# Patient Record
Sex: FEMALE | Race: BLACK OR AFRICAN AMERICAN | Employment: PART TIME | URBAN - METROPOLITAN AREA
[De-identification: names, ages, dates, MRNs, and addresses within clinical notes are randomized per-mention and may not be internally consistent; named-entity substitution may affect disease eponyms.]

---

## 2018-03-16 ENCOUNTER — OFFICE VISIT (OUTPATIENT)
Dept: ORTHOPEDIC SURGERY | Age: 18
End: 2018-03-16

## 2018-03-16 VITALS
BODY MASS INDEX: 23.25 KG/M2 | SYSTOLIC BLOOD PRESSURE: 116 MMHG | HEIGHT: 69 IN | WEIGHT: 157 LBS | HEART RATE: 72 BPM | DIASTOLIC BLOOD PRESSURE: 70 MMHG

## 2018-03-16 DIAGNOSIS — M25.561 ACUTE PAIN OF RIGHT KNEE: Primary | ICD-10-CM

## 2018-03-16 DIAGNOSIS — S83.91XA SPRAIN OF RIGHT KNEE, UNSPECIFIED LIGAMENT, INITIAL ENCOUNTER: ICD-10-CM

## 2018-03-16 PROCEDURE — 99243 OFF/OP CNSLTJ NEW/EST LOW 30: CPT | Performed by: ORTHOPAEDIC SURGERY

## 2018-03-19 ENCOUNTER — TELEPHONE (OUTPATIENT)
Dept: ORTHOPEDIC SURGERY | Age: 18
End: 2018-03-19

## 2018-03-29 ENCOUNTER — OFFICE VISIT (OUTPATIENT)
Dept: ORTHOPEDIC SURGERY | Age: 18
End: 2018-03-29

## 2018-03-29 VITALS
HEIGHT: 69 IN | BODY MASS INDEX: 22.66 KG/M2 | DIASTOLIC BLOOD PRESSURE: 68 MMHG | SYSTOLIC BLOOD PRESSURE: 102 MMHG | WEIGHT: 153 LBS | HEART RATE: 83 BPM

## 2018-03-29 DIAGNOSIS — M22.2X1 PATELLOFEMORAL PAIN SYNDROME OF RIGHT KNEE: ICD-10-CM

## 2018-03-29 DIAGNOSIS — S83.91XA SPRAIN OF RIGHT KNEE, UNSPECIFIED LIGAMENT, INITIAL ENCOUNTER: Primary | ICD-10-CM

## 2018-03-29 PROCEDURE — L1812 KO ELASTIC W/JOINTS PRE OTS: HCPCS | Performed by: ORTHOPAEDIC SURGERY

## 2018-03-29 PROCEDURE — 99214 OFFICE O/P EST MOD 30 MIN: CPT | Performed by: ORTHOPAEDIC SURGERY

## 2018-03-29 NOTE — PROGRESS NOTES
reveals pedal pulses intact and brisk cap refill. Patient is able to dorsiflex and wiggle all toes. Deep tendon reflexes of the lower extremities are normal and symmetric. Imaging: MRI images of the right knee obtained at [facility] on 3/22/18 reviewed in the office today with the patient. ACL and PCL are intact. There is no evidence of meniscal tearing or degeneration. There is no effusion appreciated. There is inflammation within the lateral femoral condyle consistent with patellofemoral syndrome. There is mild lateral tilting of the patella. Impression: #1 right knee sprain #2 right knee patellofemoral syndrome    Plan: At this time, the patient will be sent to a course of formal physical therapy. She was fitted for a patellar J brace in the office today. The patient will continue with her improvement as needed. We will keep her out of PT activities for the next month after which she may return to this. She will follow-up with us in 6 weeks if she has continued issues. Procedures    Breg Hinged Lateral Stabilizer Knee Brace     Patient was prescribed a Breg Hinged Lateral Stabilizer. The right knee will require stabilization / immobilization from this semi-rigid / rigid orthosis to improve their function. The orthosis will assist in protecting the affected area, provide functional support and facilitate healing. The patient was educated and fit by a healthcare professional with expert knowledge and specialization in brace application while under the direct supervision of the physician. Verbal and written instructions for the use of and application of this item were provided. They were instructed to contact the office immediately should the brace result in increased pain, decreased sensation, increased swelling or worsening of the condition.

## 2018-04-06 ENCOUNTER — HOSPITAL ENCOUNTER (OUTPATIENT)
Dept: OTHER | Age: 18
Discharge: HOME | End: 2018-04-24
Attending: ORTHOPAEDIC SURGERY | Admitting: ORTHOPAEDIC SURGERY

## 2018-04-09 ENCOUNTER — HOSPITAL ENCOUNTER (OUTPATIENT)
Dept: PHYSICAL THERAPY | Age: 18
Discharge: HOME | End: 2018-04-10
Admitting: ORTHOPAEDIC SURGERY

## 2018-04-09 NOTE — PROGRESS NOTES
Physical Therapy  Cancellation/No-show Note  Patient Name:  Kvng Archer  :  2000   Date:  2018  Cancelled visits to date: 1  No-shows to date: 0    For today's appointment patient:  [x]  Cancelled  []  Rescheduled appointment  []  No-show     Reason given by patient:  []  Patient ill  []  Conflicting appointment  []  No transportation    []  Conflict with work  [x]  No reason given  []  Other:     Comments:  Pt ended up showing up at 1, thinking her appt was at 1    Electronically signed by:  Loreta Goldberg, PT

## 2018-04-11 ENCOUNTER — HOSPITAL ENCOUNTER (OUTPATIENT)
Dept: PHYSICAL THERAPY | Age: 18
Discharge: HOME | End: 2018-04-12
Admitting: ORTHOPAEDIC SURGERY

## 2018-04-11 NOTE — FLOWSHEET NOTE
Physical Therapy Daily Treatment Note  Date:  2018    Patient Name:  Margarette Smith    :  2000  MRN: [id number]  Restrictions/Precautions:    Pertinent Medical History:  Medical/Treatment Diagnosis Information:  · Diagnosis: Right knee ligament sprain and patellofemoral syndrome  · Treatment Diagnosis: R knee weakness and pain affecting ambulation and high level ADLs  Insurance/Certification information:  PT Insurance Information: Apryl Sandoval preferred  Physician Information:  Referring Practitioner: Dr. Mateus Richards of care signed (Y/N):    Visit# / total visits:     Pain level: 5/10     G-Code (if applicable):      Date / Visit # G-Code Applied:  18  PT G-Codes  Functional Assessment Tool Used: LEFS  Score: 50  Functional Limitation: Mobility: Walking and moving around  Mobility: Walking and Moving Around Current Status (): At least 20 percent but less than 40 percent impaired, limited or restricted  Mobility: Walking and Moving Around Goal Status (): At least 1 percent but less than 20 percent impaired, limited or restricted    Progress Note: []  Yes  []  No  Next due by: Visit #10      History of Injury:  Pt states on 3/10/18 she was roller skating and fell on her R knee after her skate hit something. Pt states since then her knee has hurt but it has gotten a little better. Pain is on both sides and rated 4-7/10. Knee hurts to put weight through it and to bend it. Has been wearing a brace. States she had MRI and x ray that showed swelling in knee cap. Pt wants to get back to sports and skating    Subjective:     18: Pt states knee is doing okay, pain is not too bad.  Little better    Objective:  Observation:   Test measurements:      Exercises:  Exercise/Equipment Resistance/Repetitions Other comments   Nu Step X 5 min, level 4     Incline 3 x 30    Leg Press R 2 x 20 35#    LAQ R x 20  2#                        HEP     QS and SLR     HS and Gastroc stretch     Sidelying SLR

## 2018-10-13 ENCOUNTER — HOSPITAL ENCOUNTER (INPATIENT)
Age: 18
LOS: 1 days | Discharge: HOME | DRG: 832 | End: 2018-10-13
Attending: OBSTETRICS & GYNECOLOGY | Admitting: OBSTETRICS & GYNECOLOGY
Payer: COMMERCIAL

## 2018-10-13 VITALS
SYSTOLIC BLOOD PRESSURE: 115 MMHG | BODY MASS INDEX: 25.48 KG/M2 | RESPIRATION RATE: 18 BRPM | TEMPERATURE: 97.5 F | HEIGHT: 69 IN | WEIGHT: 172 LBS | DIASTOLIC BLOOD PRESSURE: 63 MMHG | HEART RATE: 87 BPM

## 2018-10-13 PROCEDURE — 99201 HC NEW PT, OUTPT VISIT LEVEL 1: CPT

## 2018-10-13 PROCEDURE — 1220000000 HC SEMI PRIVATE OB R&B

## 2018-10-13 NOTE — FLOWSHEET NOTE
Pt arrived to unit with c/o constant pain under her left breast at rib cage with sudden onset after a sneeze. Placed in Triage A, urine specimen obtained, changed into gown and placed on EFM.

## 2018-10-13 NOTE — FLOWSHEET NOTE
Called Dr. Que Yanes and advised of patient's arrival to triage. Updated on EFM and pt's complaints. She will be in to evaluate patient.

## 2019-12-29 ENCOUNTER — HOSPITAL ENCOUNTER (INPATIENT)
Age: 19
LOS: 2 days | Discharge: HOME | DRG: 463 | End: 2019-12-31
Attending: EMERGENCY MEDICINE | Admitting: SPECIALIST
Payer: MEDICARE

## 2019-12-29 ENCOUNTER — APPOINTMENT (OUTPATIENT)
Dept: CT IMAGING | Age: 19
DRG: 463 | End: 2019-12-29
Payer: MEDICARE

## 2019-12-29 PROBLEM — N12 PYELONEPHRITIS: Status: ACTIVE | Noted: 2019-12-29

## 2019-12-29 LAB
A/G RATIO: 0.9 (ref 1.1–2.2)
ALBUMIN SERPL-MCNC: 3.8 G/DL (ref 3.4–5)
ALP BLD-CCNC: 70 U/L (ref 40–129)
ALT SERPL-CCNC: 16 U/L (ref 10–40)
ANION GAP SERPL CALCULATED.3IONS-SCNC: 17 MMOL/L (ref 3–16)
AST SERPL-CCNC: 17 U/L (ref 15–37)
BASOPHILS ABSOLUTE: 0 K/UL (ref 0–0.2)
BASOPHILS RELATIVE PERCENT: 0 %
BILIRUB SERPL-MCNC: 0.4 MG/DL (ref 0–1)
BILIRUBIN URINE: NEGATIVE
BLOOD, URINE: ABNORMAL
BUN BLDV-MCNC: 8 MG/DL (ref 7–20)
CALCIUM SERPL-MCNC: 9 MG/DL (ref 8.3–10.6)
CHLORIDE BLD-SCNC: 93 MMOL/L (ref 99–110)
CLARITY: ABNORMAL
CO2: 20 MMOL/L (ref 21–32)
COLOR: YELLOW
CREAT SERPL-MCNC: 0.6 MG/DL (ref 0.6–1.1)
EOSINOPHILS ABSOLUTE: 0 K/UL (ref 0–0.6)
EOSINOPHILS RELATIVE PERCENT: 0 %
EPITHELIAL CELLS, UA: 4 /HPF (ref 0–5)
GFR AFRICAN AMERICAN: >60
GFR NON-AFRICAN AMERICAN: >60
GLOBULIN: 4.1 G/DL
GLUCOSE BLD-MCNC: 136 MG/DL (ref 70–99)
GLUCOSE URINE: NEGATIVE MG/DL
HCG(URINE) PREGNANCY TEST: NEGATIVE
HCT VFR BLD CALC: 38.7 % (ref 36–48)
HEMATOLOGY PATH CONSULT: YES
HEMOGLOBIN: 12.9 G/DL (ref 12–16)
HYALINE CASTS: 3 /LPF (ref 0–8)
KETONES, URINE: ABNORMAL MG/DL
LACTIC ACID: 0.7 MMOL/L (ref 0.4–2)
LACTIC ACID: 1.1 MMOL/L (ref 0.4–2)
LEUKOCYTE ESTERASE, URINE: ABNORMAL
LIPASE: 27 U/L (ref 13–60)
LYMPHOCYTES ABSOLUTE: 2.4 K/UL (ref 1–5.1)
LYMPHOCYTES RELATIVE PERCENT: 20 %
MAGNESIUM: 2 MG/DL (ref 1.8–2.4)
MCH RBC QN AUTO: 29.7 PG (ref 26–34)
MCHC RBC AUTO-ENTMCNC: 33.4 G/DL (ref 31–36)
MCV RBC AUTO: 88.8 FL (ref 80–100)
MICROSCOPIC EXAMINATION: YES
MONOCYTES ABSOLUTE: 1.6 K/UL (ref 0–1.3)
MONOCYTES RELATIVE PERCENT: 13 %
NEUTROPHILS ABSOLUTE: 8 K/UL (ref 1.7–7.7)
NEUTROPHILS RELATIVE PERCENT: 67 %
NITRITE, URINE: NEGATIVE
PDW BLD-RTO: 13.7 % (ref 12.4–15.4)
PH UA: 6.5 (ref 5–8)
PLATELET # BLD: 194 K/UL (ref 135–450)
PMV BLD AUTO: 9.5 FL (ref 5–10.5)
POTASSIUM REFLEX MAGNESIUM: 3.4 MMOL/L (ref 3.5–5.1)
PROTEIN UA: 30 MG/DL
RAPID INFLUENZA  B AGN: NEGATIVE
RAPID INFLUENZA A AGN: NEGATIVE
RBC # BLD: 4.36 M/UL (ref 4–5.2)
RBC # BLD: NORMAL 10*6/UL
RBC UA: 3 /HPF (ref 0–4)
SODIUM BLD-SCNC: 130 MMOL/L (ref 136–145)
SPECIFIC GRAVITY UA: 1.01 (ref 1–1.03)
TOTAL PROTEIN: 7.9 G/DL (ref 6.4–8.2)
URINE REFLEX TO CULTURE: YES
URINE TYPE: ABNORMAL
UROBILINOGEN, URINE: 1 E.U./DL
WBC # BLD: 12 K/UL (ref 4–11)
WBC UA: 17 /HPF (ref 0–5)

## 2019-12-29 PROCEDURE — 6360000002 HC RX W HCPCS: Performed by: NURSE PRACTITIONER

## 2019-12-29 PROCEDURE — 6370000000 HC RX 637 (ALT 250 FOR IP): Performed by: SPECIALIST

## 2019-12-29 PROCEDURE — 81001 URINALYSIS AUTO W/SCOPE: CPT

## 2019-12-29 PROCEDURE — 85025 COMPLETE CBC W/AUTO DIFF WBC: CPT

## 2019-12-29 PROCEDURE — 99285 EMERGENCY DEPT VISIT HI MDM: CPT

## 2019-12-29 PROCEDURE — 6360000002 HC RX W HCPCS: Performed by: SPECIALIST

## 2019-12-29 PROCEDURE — 6370000000 HC RX 637 (ALT 250 FOR IP): Performed by: NURSE PRACTITIONER

## 2019-12-29 PROCEDURE — 87186 SC STD MICRODIL/AGAR DIL: CPT

## 2019-12-29 PROCEDURE — 80053 COMPREHEN METABOLIC PANEL: CPT

## 2019-12-29 PROCEDURE — 1220000000 HC SEMI PRIVATE OB R&B

## 2019-12-29 PROCEDURE — 6360000004 HC RX CONTRAST MEDICATION: Performed by: NURSE PRACTITIONER

## 2019-12-29 PROCEDURE — 2580000003 HC RX 258: Performed by: NURSE PRACTITIONER

## 2019-12-29 PROCEDURE — 84703 CHORIONIC GONADOTROPIN ASSAY: CPT

## 2019-12-29 PROCEDURE — 2580000003 HC RX 258: Performed by: SPECIALIST

## 2019-12-29 PROCEDURE — 87086 URINE CULTURE/COLONY COUNT: CPT

## 2019-12-29 PROCEDURE — 83735 ASSAY OF MAGNESIUM: CPT

## 2019-12-29 PROCEDURE — 96365 THER/PROPH/DIAG IV INF INIT: CPT

## 2019-12-29 PROCEDURE — 83605 ASSAY OF LACTIC ACID: CPT

## 2019-12-29 PROCEDURE — 87077 CULTURE AEROBIC IDENTIFY: CPT

## 2019-12-29 PROCEDURE — 36415 COLL VENOUS BLD VENIPUNCTURE: CPT

## 2019-12-29 PROCEDURE — 83690 ASSAY OF LIPASE: CPT

## 2019-12-29 PROCEDURE — 96361 HYDRATE IV INFUSION ADD-ON: CPT

## 2019-12-29 PROCEDURE — 87040 BLOOD CULTURE FOR BACTERIA: CPT

## 2019-12-29 PROCEDURE — 87804 INFLUENZA ASSAY W/OPTIC: CPT

## 2019-12-29 PROCEDURE — 74177 CT ABD & PELVIS W/CONTRAST: CPT

## 2019-12-29 RX ORDER — 0.9 % SODIUM CHLORIDE 0.9 %
30 INTRAVENOUS SOLUTION INTRAVENOUS ONCE
Status: COMPLETED | OUTPATIENT
Start: 2019-12-29 | End: 2019-12-29

## 2019-12-29 RX ORDER — BUTALBITAL, ACETAMINOPHEN AND CAFFEINE 50; 325; 40 MG/1; MG/1; MG/1
1 TABLET ORAL EVERY 6 HOURS PRN
Status: DISCONTINUED | OUTPATIENT
Start: 2019-12-29 | End: 2019-12-31 | Stop reason: HOSPADM

## 2019-12-29 RX ORDER — SODIUM CHLORIDE 450 MG/100ML
INJECTION, SOLUTION INTRAVENOUS CONTINUOUS
Status: DISCONTINUED | OUTPATIENT
Start: 2019-12-29 | End: 2019-12-31 | Stop reason: HOSPADM

## 2019-12-29 RX ORDER — QUETIAPINE FUMARATE 50 MG/1
50 TABLET, FILM COATED ORAL NIGHTLY
Status: DISCONTINUED | OUTPATIENT
Start: 2019-12-29 | End: 2019-12-31 | Stop reason: HOSPADM

## 2019-12-29 RX ORDER — HYDROCODONE BITARTRATE AND ACETAMINOPHEN 5; 325 MG/1; MG/1
1 TABLET ORAL EVERY 6 HOURS PRN
Status: DISCONTINUED | OUTPATIENT
Start: 2019-12-29 | End: 2019-12-31 | Stop reason: HOSPADM

## 2019-12-29 RX ORDER — BUPROPION HYDROCHLORIDE 150 MG/1
150 TABLET ORAL DAILY
Status: DISCONTINUED | OUTPATIENT
Start: 2019-12-29 | End: 2019-12-31 | Stop reason: HOSPADM

## 2019-12-29 RX ORDER — ACETAMINOPHEN 325 MG/1
650 TABLET ORAL ONCE
Status: COMPLETED | OUTPATIENT
Start: 2019-12-29 | End: 2019-12-29

## 2019-12-29 RX ORDER — ONDANSETRON 2 MG/ML
4 INJECTION INTRAMUSCULAR; INTRAVENOUS ONCE
Status: DISCONTINUED | OUTPATIENT
Start: 2019-12-29 | End: 2019-12-31 | Stop reason: HOSPADM

## 2019-12-29 RX ADMIN — VANCOMYCIN HYDROCHLORIDE 1000 MG: 1 INJECTION, POWDER, LYOPHILIZED, FOR SOLUTION INTRAVENOUS at 04:43

## 2019-12-29 RX ADMIN — VANCOMYCIN HYDROCHLORIDE 750 MG: 750 INJECTION, POWDER, LYOPHILIZED, FOR SOLUTION INTRAVENOUS at 16:57

## 2019-12-29 RX ADMIN — CEFEPIME HYDROCHLORIDE 1 G: 2 INJECTION, POWDER, FOR SOLUTION INTRAVENOUS at 07:17

## 2019-12-29 RX ADMIN — ACETAMINOPHEN 650 MG: 325 TABLET ORAL at 03:28

## 2019-12-29 RX ADMIN — HYDROCODONE BITARTRATE AND ACETAMINOPHEN 1 TABLET: 5; 325 TABLET ORAL at 22:55

## 2019-12-29 RX ADMIN — IOPAMIDOL 75 ML: 755 INJECTION, SOLUTION INTRAVENOUS at 03:01

## 2019-12-29 RX ADMIN — CEFEPIME HYDROCHLORIDE 1 G: 2 INJECTION, POWDER, FOR SOLUTION INTRAVENOUS at 14:34

## 2019-12-29 RX ADMIN — HYDROCODONE BITARTRATE AND ACETAMINOPHEN 1 TABLET: 5; 325 TABLET ORAL at 16:57

## 2019-12-29 RX ADMIN — SODIUM CHLORIDE 2130 ML: 9 INJECTION, SOLUTION INTRAVENOUS at 03:28

## 2019-12-29 RX ADMIN — SODIUM CHLORIDE: 4.5 INJECTION, SOLUTION INTRAVENOUS at 13:05

## 2019-12-29 RX ADMIN — QUETIAPINE FUMARATE 50 MG: 50 TABLET ORAL at 21:23

## 2019-12-29 RX ADMIN — CEFEPIME HYDROCHLORIDE 1 G: 2 INJECTION, POWDER, FOR SOLUTION INTRAVENOUS at 21:24

## 2019-12-29 ASSESSMENT — PAIN DESCRIPTION - ONSET
ONSET: ON-GOING
ONSET: GRADUAL
ONSET: ON-GOING
ONSET: ON-GOING

## 2019-12-29 ASSESSMENT — PAIN DESCRIPTION - LOCATION
LOCATION: FLANK

## 2019-12-29 ASSESSMENT — PAIN DESCRIPTION - PAIN TYPE
TYPE: ACUTE PAIN

## 2019-12-29 ASSESSMENT — PAIN - FUNCTIONAL ASSESSMENT
PAIN_FUNCTIONAL_ASSESSMENT: ACTIVITIES ARE NOT PREVENTED

## 2019-12-29 ASSESSMENT — PAIN DESCRIPTION - FREQUENCY
FREQUENCY: CONTINUOUS

## 2019-12-29 ASSESSMENT — PAIN SCALES - GENERAL
PAINLEVEL_OUTOF10: 2
PAINLEVEL_OUTOF10: 9
PAINLEVEL_OUTOF10: 2
PAINLEVEL_OUTOF10: 8
PAINLEVEL_OUTOF10: 5
PAINLEVEL_OUTOF10: 9

## 2019-12-29 ASSESSMENT — PAIN DESCRIPTION - DESCRIPTORS
DESCRIPTORS: SHARP

## 2019-12-29 ASSESSMENT — PAIN DESCRIPTION - PROGRESSION
CLINICAL_PROGRESSION: GRADUALLY WORSENING
CLINICAL_PROGRESSION: GRADUALLY IMPROVING
CLINICAL_PROGRESSION: GRADUALLY IMPROVING
CLINICAL_PROGRESSION: GRADUALLY WORSENING

## 2019-12-29 ASSESSMENT — ENCOUNTER SYMPTOMS
RECTAL PAIN: 0
ABDOMINAL PAIN: 1
DIARRHEA: 0
CONSTIPATION: 0
BACK PAIN: 1
ABDOMINAL DISTENTION: 0
VOMITING: 0

## 2019-12-29 ASSESSMENT — PAIN DESCRIPTION - ORIENTATION
ORIENTATION: RIGHT
ORIENTATION: RIGHT

## 2019-12-29 NOTE — CONSULTS
Clinical Pharmacy Note  Vancomycin Consult    Michelle Sanchez is a 22 y.o. female ordered Vancomycin for sepsis; consult received from Marbin Pike CNP to manage therapy. Also receiving cefepime. Patient Active Problem List   Diagnosis    Depression    PTSD (post-traumatic stress disorder)    Moderate episode of recurrent major depressive disorder ([address]       Allergies:  Patient has no known allergies. Temp max:  Temp (24hrs), Av.7 °F (38.7 °C), Min:101.7 °F (38.7 °C), Max:101.7 °F (38.7 °C)      Recent Labs     19  021   WBC 12.0*       Recent Labs     19   BUN 8   CREATININE 0.6       No intake or output data in the 24 hours ending 198    Culture Results:  Pending    Ht Readings from Last 1 Encounters:   10/29/19 5' 9\" (1.753 m)        Wt Readings from Last 1 Encounters:   19 156 lb 8.4 oz (71 kg)         CrCl cannot be calculated (Unknown ideal weight. ). Assessment/Plan:  Will initiate vancomycin 1000 mg IV x 1 in ER. Pharmacy will be happy to continue dosing vancomycin upon admission with a new consult. Thank you for the consult. Will continue to follow.     Hans Baker, PharmD  2019 4:19 AM

## 2019-12-29 NOTE — ED PROVIDER NOTES
[address] [facility]        Pt Name: Vika Rinaldi  MRN: [id number]  [place] 2000  Date of evaluation: 12/29/2019  Provider: CHRIST Moctezuma - CNP  PCP: Marine Freeman MD    This patient was seen and evaluated by the attending physician Colleen [address]       Chief Complaint   Patient presents with    Flank Pain     right side, started yesterday, denies pain with urination. HISTORY OF PRESENT ILLNESS   (Location/Symptom, Timing/Onset, Context/Setting, Quality, Duration, Modifying Factors, Severity)  Note limiting factors. Vika Rinaldi is a 22 y.o. female who presents to the emergency department today complaining of right flank pain. Onset was yesterday. Symptoms started spontaneously and have been worsening. She denies associated fever. No nausea or vomiting. Normal bowels. No burning with urination or blood in urine. No vaginal discharge or bleeding. She rates the pain 9/10. Sepsis protocol initiated. Nursing Notes were all reviewed and agreed with or any disagreements were addressed in the HPI. REVIEW OF SYSTEMS    (2-9 systems for level 4, 10 or more for level 5)     Review of Systems   Constitutional: Positive for chills and fever. Gastrointestinal: Positive for abdominal pain. Negative for abdominal distention, constipation, diarrhea, rectal pain and vomiting. Genitourinary: Positive for flank pain. Negative for difficulty urinating, dysuria, frequency, hematuria, pelvic pain, vaginal bleeding and vaginal discharge. Musculoskeletal: Positive for back pain (right kidney). Skin: Negative for rash. Allergic/Immunologic: Negative for immunocompromised state. Neurological: Negative for weakness and numbness. Hematological: Negative for adenopathy. Psychiatric/Behavioral: Negative for confusion. All other systems reviewed and are negative.       Positives and Pertinent negatives as per HPI. Except as noted above in the ROS, all other systems were reviewed and negative. PAST MEDICAL HISTORY     Past Medical History:   Diagnosis Date    Anxiety     Depression     Headache     Migraines          SURGICAL HISTORY   No past surgical history on file. CURRENTMEDICATIONS       Previous Medications    BUPROPION (WELLBUTRIN XL) 150 MG EXTENDED RELEASE TABLET    Take 1 tablet by mouth daily    BUTALBITAL-APAP-CAFFEINE (FIORICET) -40 MG CAPS PER CAPSULE    Take 1 capsule by mouth every 6 hours as needed for Headaches (HA)    QUETIAPINE (SEROQUEL) 50 MG TABLET    Take 1 tablet by mouth nightly         ALLERGIES     Patient has no known allergies. FAMILYHISTORY       Family History   Problem Relation Age of Onset    Other Father     Arthritis Maternal Grandmother     High Blood Pressure Maternal Grandmother     Stroke Maternal Grandmother     Asthma Maternal Grandfather           SOCIAL HISTORY       Social History     Tobacco Use    Smoking status: Never Smoker    Smokeless tobacco: Never Used   Substance Use Topics    Alcohol use: No    Drug use: No       SCREENINGS             PHYSICAL EXAM    (up to 7 for level 4, 8 or more for level 5)     ED Triage Vitals   BP Temp Temp Source Pulse Resp SpO2 Height Weight   12/29/19 0149 12/29/19 0149 12/29/19 0149 12/29/19 0149 12/29/19 0149 12/29/19 0149 -- 12/29/19 0148   105/68 101.7 °F (38.7 °C) Oral 103 16 97 %  156 lb 8.4 oz (71 kg)       Physical Exam  Vitals signs and nursing note reviewed. Constitutional:       General: She is not in acute distress. Appearance: Normal appearance. She is well-developed. She is not toxic-appearing. HENT:      Head: Normocephalic and atraumatic. Eyes:      General: No scleral icterus. Conjunctiva/sclera: Conjunctivae normal.   Neck:      Musculoskeletal: Normal range of motion. Vascular: No JVD. Cardiovascular:      Rate and Rhythm: Regular rhythm. ureteritis. No hydronephrosis. Right ovarian follicular cyst does not require imaging follow up. Ct Abdomen Pelvis W Iv Contrast Additional Contrast? None    Result Date: 12/29/2019  EXAMINATION: CT OF THE ABDOMEN AND PELVIS WITH CONTRAST 12/29/2019 2:50 am TECHNIQUE: CT of the abdomen and pelvis was performed with the administration of intravenous contrast. Multiplanar reformatted images are provided for review. Dose modulation, iterative reconstruction, and/or weight based adjustment of the mA/kV was utilized to reduce the radiation dose to as low as reasonably achievable. COMPARISON: None. HISTORY: ORDERING SYSTEM PROVIDED HISTORY: right flank pain TECHNOLOGIST PROVIDED HISTORY: If patient is on cardiac monitor and/or pulse ox, they may be taken off cardiac monitor and pulse ox, left on O2 if currently on. All monitors reattached when patient returns to room. Additional Contrast?->None Reason for exam:->right flank pain Reason for Exam: Flank Pain (right side, started yesterday, denies pain with urination. ) Pyuria, leukocytosis FINDINGS: LOWER CHEST Lung bases: Clear lung bases. Normal included heart and pericardium. ABDOMEN Liver: Normal liver size, contour and parenchymal attenuation. Gallbladder: Normal. Biliary: No intra or extrahepatic bile duct dilatation. Pancreas: Normal. Spleen: Normal. Adrenals: Normal. Kidneys: Heterogeneous masslike area of nonenhancement within the right renal inferior pole, posteriorly. Kidneys otherwise demonstrate relatively uniform symmetric parenchymal enhancement. Surrounding fat stranding. No hydronephrosis or nephrolithiasis. Right urothelial enhancement and periureteral inflammatory stranding GI Tract: Normal distal esophagus, stomach and duodenal sweep. No apparent bowel wall thickening or obstruction. Appendix is not identified, however there is no pericecal inflammatory stranding or fluid.  Peritoneum/Retroperitoneum: No enlarged lymph nodes, free air or free fluid. Vascular: Normal abdominal aorta and IVC. Patent hepatic, portal, superior mesenteric and splenic veins. PELVIS Genitourinary: Circumferential urinary bladder wall thickening. 2.9 cm right ovarian simple cyst.  Normal left ovary. Fluid and/or glandular tissue within the uterus. Other: No free fluid. No enlarged lymph nodes. MUSCULOSKELETAL Bones and Soft Tissues: No acute superficial soft tissue or osseous abnormality. Focal pyelonephritis at the right renal inferior pole, probably developing into a renal abscess. Additional features of cystitis and right ureteritis. No hydronephrosis. Right ovarian follicular cyst does not require imaging follow up. PROCEDURES   Unless otherwise noted below, none     Procedures    CRITICAL CARE TIME   CRITICAL CARE NOTE:  There was a high probability of clinically significant life-threatening deterioration of the patient's condition requiring my urgent intervention. Total critical care time was at least 35 minutes. This includes vital sign monitoring, pulse oximetry monitoring, telemetry monitoring, clinical response to the IV medications, reviewing the nursing notes, consultation time, dictation/documentation time, and interpretation of the labwork. This excludes any separately billable procedures performed.       CONSULTS:  IP CONSULT TO PRIMARY CARE PROVIDER  PHARMACY TO DOSE VANCOMYCIN  IP CONSULT TO UROLOGY      EMERGENCY DEPARTMENT COURSE and DIFFERENTIAL DIAGNOSIS/MDM:   Vitals:    Vitals:    12/29/19 0148 12/29/19 0149   BP:  105/68   Pulse:  103   Resp:  16   Temp:  101.7 °F (38.7 °C)   TempSrc:  Oral   SpO2:  97%   Weight: 156 lb 8.4 oz (71 kg)        Patient was given thefollowing medications:  Medications   0.9 % sodium chloride bolus (2,130 mLs Intravenous New Bag 12/29/19 0328)   ondansetron (ZOFRAN) injection 4 mg (has no administration in time range)   cefepime (MAXIPIME) 1 g IVPB minibag (has no administration in time range) DISPOSITION/PLAN   DISPOSITION Decision To Admit 12/29/2019 04:06:07 AM      PATIENT REFERREDTO:  No follow-up provider specified.     DISCHARGE MEDICATIONS:  New Prescriptions    No medications on file       DISCONTINUED MEDICATIONS:  Discontinued Medications    No medications on file              (Please note that portions of this note were completed with a voice recognition program.  Efforts were made to edit the dictations but occasionally words are mis-transcribed.)    CHRIST Silva CNP (electronically signed)           CHRIST Silva CNP  12/29/19 9782

## 2019-12-30 ENCOUNTER — APPOINTMENT (OUTPATIENT)
Dept: ULTRASOUND IMAGING | Age: 19
DRG: 463 | End: 2019-12-30
Payer: MEDICARE

## 2019-12-30 PROBLEM — N10 ACUTE PYELONEPHRITIS: Status: ACTIVE | Noted: 2019-12-29

## 2019-12-30 LAB — HEMATOLOGY PATH CONSULT: NORMAL

## 2019-12-30 PROCEDURE — 6370000000 HC RX 637 (ALT 250 FOR IP): Performed by: SPECIALIST

## 2019-12-30 PROCEDURE — 6360000002 HC RX W HCPCS: Performed by: NURSE PRACTITIONER

## 2019-12-30 PROCEDURE — 76770 US EXAM ABDO BACK WALL COMP: CPT

## 2019-12-30 PROCEDURE — 1220000000 HC SEMI PRIVATE OB R&B

## 2019-12-30 PROCEDURE — 2580000003 HC RX 258: Performed by: NURSE PRACTITIONER

## 2019-12-30 PROCEDURE — 2580000003 HC RX 258: Performed by: SPECIALIST

## 2019-12-30 PROCEDURE — 99232 SBSQ HOSP IP/OBS MODERATE 35: CPT | Performed by: INTERNAL MEDICINE

## 2019-12-30 RX ADMIN — HYDROCODONE BITARTRATE AND ACETAMINOPHEN 1 TABLET: 5; 325 TABLET ORAL at 19:27

## 2019-12-30 RX ADMIN — HYDROCODONE BITARTRATE AND ACETAMINOPHEN 1 TABLET: 5; 325 TABLET ORAL at 05:37

## 2019-12-30 RX ADMIN — QUETIAPINE FUMARATE 50 MG: 50 TABLET ORAL at 22:16

## 2019-12-30 RX ADMIN — BUPROPION HYDROCHLORIDE 150 MG: 150 TABLET, FILM COATED, EXTENDED RELEASE ORAL at 10:56

## 2019-12-30 RX ADMIN — HYDROCODONE BITARTRATE AND ACETAMINOPHEN 1 TABLET: 5; 325 TABLET ORAL at 10:56

## 2019-12-30 RX ADMIN — CEFEPIME HYDROCHLORIDE 1 G: 2 INJECTION, POWDER, FOR SOLUTION INTRAVENOUS at 22:16

## 2019-12-30 RX ADMIN — SODIUM CHLORIDE: 4.5 INJECTION, SOLUTION INTRAVENOUS at 13:00

## 2019-12-30 RX ADMIN — SODIUM CHLORIDE: 4.5 INJECTION, SOLUTION INTRAVENOUS at 01:33

## 2019-12-30 RX ADMIN — BUTALBITAL, ACETAMINOPHEN, AND CAFFEINE 1 TABLET: 50; 325; 40 TABLET ORAL at 04:16

## 2019-12-30 RX ADMIN — CEFEPIME HYDROCHLORIDE 1 G: 2 INJECTION, POWDER, FOR SOLUTION INTRAVENOUS at 14:09

## 2019-12-30 RX ADMIN — CEFEPIME HYDROCHLORIDE 1 G: 2 INJECTION, POWDER, FOR SOLUTION INTRAVENOUS at 05:38

## 2019-12-30 ASSESSMENT — PAIN DESCRIPTION - FREQUENCY
FREQUENCY: CONTINUOUS

## 2019-12-30 ASSESSMENT — PAIN DESCRIPTION - LOCATION
LOCATION: FLANK
LOCATION: HEAD
LOCATION: HEAD
LOCATION: FLANK

## 2019-12-30 ASSESSMENT — PAIN DESCRIPTION - PROGRESSION
CLINICAL_PROGRESSION: GRADUALLY IMPROVING
CLINICAL_PROGRESSION: NOT CHANGED
CLINICAL_PROGRESSION: GRADUALLY WORSENING
CLINICAL_PROGRESSION: NOT CHANGED
CLINICAL_PROGRESSION: GRADUALLY WORSENING

## 2019-12-30 ASSESSMENT — PAIN - FUNCTIONAL ASSESSMENT
PAIN_FUNCTIONAL_ASSESSMENT: ACTIVITIES ARE NOT PREVENTED

## 2019-12-30 ASSESSMENT — PAIN SCALES - GENERAL
PAINLEVEL_OUTOF10: 5
PAINLEVEL_OUTOF10: 2
PAINLEVEL_OUTOF10: 7
PAINLEVEL_OUTOF10: 1
PAINLEVEL_OUTOF10: 4
PAINLEVEL_OUTOF10: 2
PAINLEVEL_OUTOF10: 6
PAINLEVEL_OUTOF10: 4
PAINLEVEL_OUTOF10: 4

## 2019-12-30 ASSESSMENT — PAIN DESCRIPTION - ONSET
ONSET: GRADUAL
ONSET: GRADUAL
ONSET: AWAKENED FROM SLEEP
ONSET: GRADUAL

## 2019-12-30 ASSESSMENT — PAIN DESCRIPTION - PAIN TYPE
TYPE: ACUTE PAIN

## 2019-12-30 ASSESSMENT — PAIN DESCRIPTION - DESCRIPTORS
DESCRIPTORS: ACHING
DESCRIPTORS: SHARP
DESCRIPTORS: ACHING

## 2019-12-30 ASSESSMENT — PAIN DESCRIPTION - ORIENTATION
ORIENTATION: RIGHT

## 2019-12-30 NOTE — PROGRESS NOTES
Hospitalist Progress Note  12/30/2019 1:47 PM  Subjective:   Admit Date: 12/29/2019  PCP: Yehuda Johnson MD  Interval History: pt feels better  But still is in pain  Denies any other complaints  Chart reviewed. Diet: DIET GENERAL;  Medications:   Scheduled Meds:   ondansetron  4 mg Intravenous Once    cefepime  1 g Intravenous Q8H    buPROPion  150 mg Oral Daily    QUEtiapine  50 mg Oral Nightly     Continuous Infusions:   sodium chloride 100 mL/hr at 12/30/19 1300     CBC:   Recent Labs     12/29/19 0215   WBC 12.0*   HGB 12.9        BMP:    Recent Labs     12/29/19 0215   *   K 3.4*   CL 93*   CO2 20*   BUN 8   CREATININE 0.6   GLUCOSE 136*     Hepatic:   Recent Labs     12/29/19 0215   AST 17   ALT 16   BILITOT 0.4   ALKPHOS 70     Troponin: No results for input(s): TROPONINI in the last 72 hours. BNP: No results for input(s): BNP in the last 72 hours. Lipids: No results for input(s): CHOL, HDL in the last 72 hours. Invalid input(s): LDLCALCU  INR: No results for input(s): INR in the last 72 hours. Objective:   Vitals: /69   Pulse 81   Temp 98.1 °F (36.7 °C) (Oral)   Resp 12   Wt 156 lb 8.4 oz (71 kg)   SpO2 98%   BMI 23.11 kg/m²   General appearance: alert,awake,oriented x 3 and cooperative with exam  HEENT: Head: Normal, normocephalic, atraumatic, anicteric, pupils are reactive to light. Dry mucous membrane. Neck: no adenopathy, no carotid bruit, supple, symmetrical, trachea midline and thyroid not enlarged, symmetric, no tenderness/mass/nodules  Lungs: clear to auscultation bilaterally  Heart: regular rate and rhythm, S1, S2 normal, no murmur, click, rub or gallop  Abdomen: soft, non-tender; bowel sounds normal; no masses,  no organomegaly  R cvat present  Extremities: extremities normal, atraumatic, no cyanosis or edema  Neurologic: Mental status: Alert, oriented, thought content appropriate    Assessment and Plan:   1.  Pyelonephritis-cont abx    Patient Active Problem List:     Depression     PTSD (post-traumatic stress disorder)     Moderate episode of recurrent major depressive disorder (HCC)     Pyelonephritis    Pt is stable. Discussed with staff and pt about the plan. See the orders for further plan. Will proceed further acc to pt's progress.   Time spent with management of the pt is-20 minutes      Electronically signed by: Maria Alejandra [place] MD, on 12/30/2019, at 1:47 PM

## 2019-12-30 NOTE — H&P
[address] [place] [address] [address]                              HISTORY AND PHYSICAL    PATIENT NAME: Annabelle Hartley                        :        2000  MED REC NO:   [id number]                          ROOM:  ACCOUNT NO:   [de-identified]                           ADMIT DATE: 2019  PROVIDER:     Korina Arceo MD    ATTENDING PHYSICIAN:  Jenny Banuelos MD    CHIEF COMPLAINT:  Right flank pain, chills. HISTORY OF PRESENT ILLNESS:  This 64-year-old female patient came to the  emergency room because of a right flank pain started one day ago,  started with some nausea, progressively worse right flank pain, some  chills, and thought something is wrong. The patient was found to have a  pyelonephritis, possible right kidney abscess. The patient was admitted  for antibiotic therapy. The patient has been treated with upper  respiratory infection with amoxicillin a few days ago. The patient  denies any hematuria or dysuria. PAST MEDICAL HISTORY:  Significant for PTSD, depression, some _____. ALLERGIES:  No known allergy. CURRENT MEDICATIONS:  See the reconciliation sheet. No current facility-administered medications for this encounter.       Current Outpatient Medications   Medication Sig Dispense Refill    ondansetron (ZOFRAN) 4 MG tablet Take 1 tablet by mouth 3 times daily as needed for Nausea or Vomiting 15 tablet 0    ibuprofen (ADVIL;MOTRIN) 800 MG tablet Take 1 tablet by mouth every 8 hours as needed for Pain 30 tablet 3    cefUROXime (CEFTIN) 500 MG tablet Take 1 tablet by mouth 2 times daily for 7 days 14 tablet 0    butalbital-APAP-caffeine (FIORICET) -40 MG CAPS per capsule Take 1 capsule by mouth every 6 hours as needed for Headaches (HA) 30 capsule 2    buPROPion (WELLBUTRIN XL) 150 MG extended release tablet Take 1 tablet by mouth daily 30 tablet 2    QUEtiapine (SEROQUEL) 50 MG tablet Take 1 tablet by mouth nightly 30 tablet 2       FAMILY HISTORY:  Noncontributory. 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 Nonsmoker and  nondrinker. Lives with the fiance. REVIEW OF SYSTEMS:  Mild headache on the right side. No visual  symptoms. No swallowing problem. No ear pain. No sore throat. No  chest pain. No palpitation. No pleuritic pain. Had some right flank  pain. No nausea, vomiting. No diarrhea. Denies any hematuria,  dysuria. No joint aches or pain. PHYSICAL EXAMINATION:  VITAL SIGNS:  The patient's blood pressure was 102/61, respiratory rate  was 16, pulse 89, temperature 99.2. The patient weighed 156 pounds. Saturation 99% on room. GENERAL:  The patient is alert and oriented, well-developed,  well-nourished, pleasant young lady. SKIN:  Warm and dry. HEENT:  Head:  Normocephalic, atraumatic. Pupils are equal on both  sides, reactive to light and accommodation. Ears, Nose, and Throat: Within normal limits. Mucous membranes are moist.  NECK:  Supple. No JVD. No lymphadenopathy. No thyromegaly. LUNGS:  Clear bilaterally. HEART:  S1, S2.  Regular rhythm. ABDOMEN:  Soft. Bowel sounds present. The right flank is tender. No  guarding. Lower abdominal exam is normal.  EXTREMITIES:  No edema. No cyanosis. No clubbing. LABORATORY STUDIES:  WBC count was 12.0, hemoglobin 12.9, hematocrit  38.7, platelet count 075. Sodium 130, potassium 3.4, chloride 93, CO2  20, BUN 8, creatinine 0.6, glucose 136. Magnesium 2.0. The urine was  cloudy, small blood, small leukocyte esterase. CT scan showed focal pyelonephritis in the right renal inferior pole,  rest is normal.    Lactic acid 1.1.    ASSESSMENT:  Right flank pain, suspect pyelonephritis, history of UTI,  history of migraine, anxiety, and depression. PLAN:  Continue current antibiotic therapy. Wait for the culture  report. Resume home medication.   The patient will be admitted to the  OB/GYN floor because of lack of beds on the other floors. Pain control. Renal ultrasound. She can also have some Urology consult but nothing is  urgent at the present time. I spent more than 30 minutes on face-to-face evaluation with the  patient. I discussed the management and findings with the patient and  nursing staff.         Jcarlos Lopez MD    D: 12/29/2019 16:07:51       T: 12/29/2019 18:58:52     [email or web address]  Job#: [id number]     Doc#: [id number]    CC:

## 2019-12-30 NOTE — CONSULTS
Consulting Physician: ER    Reason for Consult: pyelonephritis    History of Present Illness: Te Salcedo is a 22 y.o. first febrile UTI - CT scan with severe right lower pole lobar nephritis - may abscess but too soon to have specific findings    Past Medical History:   Past Medical History:   Diagnosis Date    Anxiety     Depression     Headache     Migraines        Past Surgical History:  No past surgical history on file.     Social History:  Social History     Socioeconomic History    Marital status: Single     Spouse name: Not on file    Number of children: Not on file    Years of education: Not on file    Highest education level: Not on file   Occupational History    Not on file   Social Needs    Financial resource strain: Not on file    Food insecurity:     Worry: Not on file     Inability: Not on file    Transportation needs:     Medical: Not on file     Non-medical: Not on file   Tobacco Use    Smoking status: Never Smoker    Smokeless tobacco: Never Used   Substance and Sexual Activity    Alcohol use: No    Drug use: No    Sexual activity: Yes     Partners: Male   Lifestyle    Physical activity:     Days per week: Not on file     Minutes per session: Not on file    Stress: Not on file   Relationships    Social connections:     Talks on phone: Not on file     Gets together: Not on file     Attends [employer] service: Not on file     Active member of club or organization: Not on file     Attends meetings of clubs or organizations: Not on file     Relationship status: Not on file    Intimate partner violence:     Fear of current or ex partner: Not on file     Emotionally abused: Not on file     Physically abused: Not on file     Forced sexual activity: Not on file   Other Topics Concern    Not on file   Social History Narrative    Not on file       Family History:  Family History   Problem Relation Age of Onset    Other Father     Arthritis Maternal Grandmother     High Blood

## 2019-12-30 NOTE — PROGRESS NOTES
Pt Name: Karla Hartley Record Number: [id number]  Date of Birth 2000   Today's Date: 12/30/2019      Subjective:  Awake in bed, continues to have right flank pain 5/10 scale. Denies urinary symptoms. Overall feeling better from admission. ROS: Constitutional: No fever    Vitals:  Vitals:    12/29/19 1300 12/29/19 1430 12/29/19 1525 12/30/19 0015   BP: 92/82  (!) 115/56 106/70   Pulse:  106 102 97   Resp:  20 16 16   Temp:  98.7 °F (37.1 °C) 98.2 °F (36.8 °C) 98.1 °F (36.7 °C)   TempSrc:  Oral Oral Oral   SpO2:   98%    Weight:         I/O last 3 completed shifts: In: 3016.7 [P.O.:1200; I.V.:1716.7; IV Piggyback:100]  Out: 600 [Urine:600]    Exam:  General: Awake, oriented, no acute distress  Respiratory: Nonlabored breathing  Abdomen: Soft, non-tender, non-distended, no masses  : spontaneously voiding  Skin: Skin color, texture, turgor normal, no rashes or lesions  Neurologic: no gross deficits    CURRENT MEDICATIONS   Scheduled Meds:   ondansetron  4 mg Intravenous Once    cefepime  1 g Intravenous Q8H    buPROPion  150 mg Oral Daily    QUEtiapine  50 mg Oral Nightly     Continuous Infusions:   sodium chloride 100 mL/hr at 12/30/19 0133     PRN Meds:.butalbital-acetaminophen-caffeine, HYDROcodone 5 mg - acetaminophen    LABS     Recent Labs     12/29/19  0215   WBC 12.0*   HGB 12.9   HCT 38.7      *   K 3.4*   CL 93*   CO2 20*   BUN 8   CREATININE 0.6   MG 2.00   CALCIUM 9.0   AST 17   ALT 16   BILITOT 0.4     Urine and blood cultures NGTD    ASSESSMENT   1. Hospital day # 1  2. Right low pole lobar nephritis. PLAN   1.  Continue IV antibiotics  2. if she fails to improve - high WBC, fevers > 72 hours, high fever curve then re-image - CT guided drainage if an abscess forms    Ramos Scherer, CHRIST - CNP 12/30/2019 9:30 AM

## 2019-12-30 NOTE — PLAN OF CARE
Problem: Fluid Volume - Deficit:  Goal: Electrolytes within specified parameters  Description  Electrolytes within specified parameters  Outcome: Met This Shift

## 2019-12-30 NOTE — ED PROVIDER NOTES
[facility]      Pt Name: Nusrat Aranda  MRN: [id number]  [place] 2000  Date of evaluation: 12/29/2019  Provider: Jennifer Mart, [address] [place] [address]  Chief Complaint   Patient presents with    Flank Pain     right side, started yesterday, denies pain with urination. I have fully participated in the care of Nusrat Aranda and have had a face-to-face evaluation. I have reviewed and agree with all pertinent clinical information, and midlevel provider's history, and physical exam. I have also reviewed the labs and imaging studies and treatment plan. I have also reviewed and agree with the medications, allergies and past medical history section for this Nusrat Aranda. I agree with the diagnosis, and I concur. Past Medical History:   Diagnosis Date    Anxiety     Depression     Headache     Migraines        MDM:  Nusrat Aranda is a 22 y.o. female who presents with right flank pain that started yesterday. Is increased in pain. She denies any fevers or chills. She denies any nausea or vomiting. She denies any vaginal discharge. She rates the pain is 9/10. Movement makes it worse and rest makes it better. She describes her pain is severe. Physical exam reveals tenderness in the right flank with tenderness percussion. CT scan reveals a renal abscess on the right. She is a white count 12,000. Patient was admitted to the hospital for further evaluation treatment. Hospitalist was notified. She was started on IV antibiotics in the emergency department. Vitals:    12/30/19 0015   BP: 106/70   Pulse: 97   Resp: 16   Temp: 98.1 °F (36.7 °C)   SpO2:        Labs Reviewed   URINE CULTURE - Abnormal; Notable for the following components:       Result Value    Urine Culture, Routine   (*)     Value: <10,000 CFU/ml mixed skin/urogenital olvin.  No further workup    Organism Escherichia coli (*)     All other components within normal limits    Narrative:     ORDER#: [id number]                          ORDERED BY: Severino Layton  SOURCE: Urine Clean Catch                  COLLECTED:  12/29/19 02:13  ANTIBIOTICS AT JOSLYN.:                      RECEIVED :  12/29/19 03:12  Performed at:  [facility]  [address] [place] [address]   Phone [phone]   CBC WITH AUTO DIFFERENTIAL - Abnormal; Notable for the following components:    WBC 12.0 (*)     Neutrophils Absolute 8.0 (*)     Monocytes Absolute 1.6 (*)     All other components within normal limits    Narrative:     Performed at:  [facility] [address]   Phone [phone]   COMPREHENSIVE METABOLIC PANEL W/ REFLEX TO MG FOR LOW K - Abnormal; Notable for the following components:    Sodium 130 (*)     Potassium reflex Magnesium 3.4 (*)     Chloride 93 (*)     CO2 20 (*)     Anion Gap 17 (*)     Glucose 136 (*)     Albumin/Globulin Ratio 0.9 (*)     All other components within normal limits    Narrative:     Performed at:  [address] [facility] [address]   Phone [phone]   URINE RT REFLEX TO CULTURE - Abnormal; Notable for the following components:    Clarity, UA CLOUDY (*)     Ketones, Urine TRACE (*)     Blood, Urine SMALL (*)     Protein, UA 30 (*)     Leukocyte Esterase, Urine SMALL (*)     All other components within normal limits    Narrative:     Performed at:  [facility]  [address] [facility] [place] [address]   Phone [phone]   MICROSCOPIC URINALYSIS - Abnormal; Notable for the following components:    WBC, UA 17 (*)     All other components within normal limits    Narrative:     Performed at:  [address] [place] [address]   Phone [phone]   CULTURE BLOOD #1    Narrative:     ORDER#: [id number]                          ORDERED BY: [address] [place] [address], abscess. Additional features of cystitis and right ureteritis. No hydronephrosis. Right ovarian follicular cyst does not require imaging follow up. US RENAL COMPLETE    (Results Pending)         Medications   ondansetron (ZOFRAN) injection 4 mg (4 mg Intravenous Not Given 12/29/19 1512)   cefepime (MAXIPIME) 1 g IVPB minibag (0 g Intravenous Stopped 12/30/19 0608)   0.45 % sodium chloride infusion ( Intravenous New Bag 12/30/19 0133)   buPROPion (WELLBUTRIN XL) extended release tablet 150 mg (150 mg Oral Not Given 12/29/19 1650)   butalbital-acetaminophen-caffeine (FIORICET, ESGIC) per tablet 1 tablet (1 tablet Oral Given 12/30/19 0416)   QUEtiapine (SEROQUEL) tablet 50 mg (50 mg Oral Given 12/29/19 2123)   HYDROcodone-acetaminophen (NORCO) 5-325 MG per tablet 1 tablet (1 tablet Oral Given 12/30/19 0537)   0.9 % sodium chloride bolus (0 mL/kg × 71 kg Intravenous Stopped 12/29/19 0556)   acetaminophen (TYLENOL) tablet 650 mg (650 mg Oral Given 12/29/19 0328)   iopamidol (ISOVUE-370) 76 % injection 75 mL (75 mLs Intravenous Given 12/29/19 0301)   vancomycin 1000 mg IVPB in 250 mL D5W addavial (0 mg Intravenous Stopped 12/29/19 0556)   vancomycin (VANCOCIN) 750 mg in dextrose 5 % 250 mL IVPB (750 mg Intravenous New Bag 12/29/19 1657)       Current Discharge Medication List          The patient's blood pressure was not found to be elevated according to CMS/Medicare and the Affordable Care Act/ObamaCare criteria. IMPRESSIONS:  1. Septicemia ([address]    2. Renal abscess    3.  Acute pyelonephritis               Meghan Diaz, DO  12/30/19 1881

## 2019-12-31 VITALS
WEIGHT: 156.53 LBS | BODY MASS INDEX: 23.11 KG/M2 | HEART RATE: 86 BPM | DIASTOLIC BLOOD PRESSURE: 62 MMHG | TEMPERATURE: 98.2 F | SYSTOLIC BLOOD PRESSURE: 102 MMHG | RESPIRATION RATE: 16 BRPM | OXYGEN SATURATION: 98 %

## 2019-12-31 PROBLEM — N12 PYELONEPHRITIS: Status: ACTIVE | Noted: 2019-12-31

## 2019-12-31 LAB
HCT VFR BLD CALC: 34 % (ref 36–48)
HEMOGLOBIN: 11.4 G/DL (ref 12–16)
MCH RBC QN AUTO: 29.9 PG (ref 26–34)
MCHC RBC AUTO-ENTMCNC: 33.4 G/DL (ref 31–36)
MCV RBC AUTO: 89.4 FL (ref 80–100)
ORGANISM: ABNORMAL
PDW BLD-RTO: 13.4 % (ref 12.4–15.4)
PLATELET # BLD: 188 K/UL (ref 135–450)
PMV BLD AUTO: 9.8 FL (ref 5–10.5)
RBC # BLD: 3.8 M/UL (ref 4–5.2)
URINE CULTURE, ROUTINE: ABNORMAL
URINE CULTURE, ROUTINE: ABNORMAL
WBC # BLD: 5.3 K/UL (ref 4–11)

## 2019-12-31 PROCEDURE — 2580000003 HC RX 258: Performed by: SPECIALIST

## 2019-12-31 PROCEDURE — 99238 HOSP IP/OBS DSCHRG MGMT 30/<: CPT | Performed by: INTERNAL MEDICINE

## 2019-12-31 PROCEDURE — 2580000003 HC RX 258: Performed by: NURSE PRACTITIONER

## 2019-12-31 PROCEDURE — 36415 COLL VENOUS BLD VENIPUNCTURE: CPT

## 2019-12-31 PROCEDURE — 6370000000 HC RX 637 (ALT 250 FOR IP): Performed by: SPECIALIST

## 2019-12-31 PROCEDURE — 6360000002 HC RX W HCPCS: Performed by: NURSE PRACTITIONER

## 2019-12-31 PROCEDURE — 85027 COMPLETE CBC AUTOMATED: CPT

## 2019-12-31 RX ORDER — ONDANSETRON 4 MG/1
4 TABLET, FILM COATED ORAL 3 TIMES DAILY PRN
Qty: 15 TABLET | Refills: 0 | Status: SHIPPED | OUTPATIENT
Start: 2019-12-31 | End: 2020-03-19 | Stop reason: SDUPTHER

## 2019-12-31 RX ORDER — CEFUROXIME AXETIL 500 MG/1
500 TABLET ORAL 2 TIMES DAILY
Qty: 14 TABLET | Refills: 0 | Status: SHIPPED | OUTPATIENT
Start: 2019-12-31 | End: 2020-01-07

## 2019-12-31 RX ORDER — IBUPROFEN 800 MG/1
800 TABLET ORAL EVERY 8 HOURS PRN
Qty: 30 TABLET | Refills: 3 | Status: SHIPPED | OUTPATIENT
Start: 2019-12-31 | End: 2020-03-19 | Stop reason: SDUPTHER

## 2019-12-31 RX ADMIN — SODIUM CHLORIDE: 4.5 INJECTION, SOLUTION INTRAVENOUS at 00:32

## 2019-12-31 RX ADMIN — BUPROPION HYDROCHLORIDE 150 MG: 150 TABLET, FILM COATED, EXTENDED RELEASE ORAL at 08:03

## 2019-12-31 RX ADMIN — CEFEPIME HYDROCHLORIDE 1 G: 2 INJECTION, POWDER, FOR SOLUTION INTRAVENOUS at 06:18

## 2019-12-31 ASSESSMENT — PAIN DESCRIPTION - PAIN TYPE: TYPE: ACUTE PAIN

## 2019-12-31 ASSESSMENT — PAIN - FUNCTIONAL ASSESSMENT: PAIN_FUNCTIONAL_ASSESSMENT: ACTIVITIES ARE NOT PREVENTED

## 2019-12-31 ASSESSMENT — PAIN DESCRIPTION - LOCATION: LOCATION: FLANK

## 2019-12-31 ASSESSMENT — PAIN DESCRIPTION - PROGRESSION
CLINICAL_PROGRESSION: GRADUALLY IMPROVING
CLINICAL_PROGRESSION: GRADUALLY IMPROVING

## 2019-12-31 ASSESSMENT — PAIN DESCRIPTION - DESCRIPTORS: DESCRIPTORS: SHARP

## 2019-12-31 ASSESSMENT — PAIN SCALES - GENERAL: PAINLEVEL_OUTOF10: 6

## 2019-12-31 ASSESSMENT — PAIN DESCRIPTION - FREQUENCY: FREQUENCY: CONTINUOUS

## 2019-12-31 ASSESSMENT — PAIN DESCRIPTION - ORIENTATION: ORIENTATION: RIGHT

## 2019-12-31 ASSESSMENT — PAIN DESCRIPTION - ONSET: ONSET: GRADUAL

## 2019-12-31 NOTE — FLOWSHEET NOTE
Verbal and written discharge instructions were given to the patient, patient verbalizes understanding of discharge instructions including medications orders for home and possible side effects associated with them. Patient instructed and verbalizes understanding to call the doctor listed if they should have any complications or worsening symptoms. Verbalizes understanding about follow-up appointments. D/C IV patient tolerated well, no signs of bleeding. Patient discharged home with all belongings. Out via ambulation with spouse.

## 2019-12-31 NOTE — PROGRESS NOTES
Pt Name: Myesha Mera Record Number: [id number]  Date of Birth 2000   Today's Date: 12/31/2019      Subjective:  Awake in bed, pain continues to improve. Denies urinary symptoms. ROS: Constitutional: No fever    Vitals:  Vitals:    12/30/19 2024 12/31/19 0036 12/31/19 0508 12/31/19 0755   BP: 100/62 101/63 100/69 102/62   Pulse: 72 90 87 86   Resp: 16 16 16 16   Temp: 98.4 °F (36.9 °C) 97.9 °F (36.6 °C) 98 °F (36.7 °C) 98.2 °F (36.8 °C)   TempSrc: Oral Oral Oral Oral   SpO2:       Weight:         I/O last 3 completed shifts: In: 36 [I.V.:848]  Out: -     Exam:  General: Awake, oriented, no acute distress  Respiratory: Nonlabored breathing  Abdomen: Soft, non-tender, non-distended, no masses  : spontaneously voiding  Skin: Skin color, texture, turgor normal, no rashes or lesions  Neurologic: no gross deficits    CURRENT MEDICATIONS   Scheduled Meds:   ondansetron  4 mg Intravenous Once    cefepime  1 g Intravenous Q8H    buPROPion  150 mg Oral Daily    QUEtiapine  50 mg Oral Nightly     Continuous Infusions:   sodium chloride 100 mL/hr at 12/31/19 0032     PRN Meds:.butalbital-acetaminophen-caffeine, HYDROcodone 5 mg - acetaminophen    LABS     Recent Labs     12/29/19  0215   WBC 12.0*   HGB 12.9   HCT 38.7      *   K 3.4*   CL 93*   CO2 20*   BUN 8   CREATININE 0.6   MG 2.00   CALCIUM 9.0   AST 17   ALT 16   BILITOT 0.4     Urine and blood cultures NGTD    ASSESSMENT   1. Hospital day # 2  2. Right low pole lobar nephritis. PLAN   1. Continue antibiotics x 2 weeks. 2. Ok to discharge from urology standpoint. If symptoms worsen or develops fever, notified to call.      CHRIST Sharma - CNP 12/31/2019 8:40 AM

## 2019-12-31 NOTE — PLAN OF CARE
Problem: Pain:  Goal: Pain level will decrease  Description  Pain level will decrease  12/31/2019 0938 by Savanna Nick RN  Outcome: Ongoing  12/31/2019 0649 by Immanuel Appiah RN  Outcome: Met This Shift  Goal: Control of acute pain  Description  Control of acute pain  12/31/2019 0938 by Savanna Nick RN  Outcome: Ongoing  12/31/2019 0649 by Immanuel Appiah RN  Outcome: Met This Shift  Goal: Control of chronic pain  Description  Control of chronic pain  Outcome: Ongoing     Problem: Discharge Planning:  Goal: Participates in care planning  Description  Participates in care planning  Outcome: Ongoing  Goal: Discharged to appropriate level of care  Description  Discharged to appropriate level of care  Outcome: Ongoing     Problem: Fluid Volume - Deficit:  Goal: Electrolytes within specified parameters  Description  Electrolytes within specified parameters  12/31/2019 0938 by Savanna Nick RN  Outcome: Ongoing  12/31/2019 0649 by Immanuel Appiah RN  Outcome: Met This Shift

## 2019-12-31 NOTE — PLAN OF CARE
Problem: Pain:  Goal: Pain level will decrease  Description  Pain level will decrease  12/31/2019 0649 by Leanne Tovar RN  Outcome: Met This Shift     Problem: Fluid Volume - Deficit:  Goal: Electrolytes within specified parameters  Description  Electrolytes within specified parameters  12/31/2019 0649 by Leanne Tovar RN  Outcome: Met This Shift     Problem: Pain:  Goal: Control of acute pain  Description  Control of acute pain  12/31/2019 0649 by Leanne Tovar RN  Outcome: Met This Shift

## 2020-01-02 LAB
BLOOD CULTURE, ROUTINE: NORMAL
CULTURE, BLOOD 2: NORMAL

## 2020-01-21 NOTE — DISCHARGE SUMMARY
Hospitalist Discharge Summary   1/21/2020 1:41 PM  Subjective:   Admit Date: 12/29/2019  PCP: Carlos Ojeda MD  Interval History: pt is ready for the discharge  Feels better  Treated for uti  Rest of the events as in progress notes and chart  Denies any other complaints  Chart reviewed. Diet: No diet orders on file  Medications:   Scheduled Meds:  Continuous Infusions:  CBC: No results for input(s): WBC, HGB, PLT in the last 72 hours. BMP:  No results for input(s): NA, K, CL, CO2, BUN, CREATININE, GLUCOSE in the last 72 hours. Hepatic: No results for input(s): AST, ALT, ALB, BILITOT, ALKPHOS in the last 72 hours. Troponin: No results for input(s): TROPONINI in the last 72 hours. BNP: No results for input(s): BNP in the last 72 hours. Lipids: No results for input(s): CHOL, HDL in the last 72 hours. Invalid input(s): LDLCALCU  INR: No results for input(s): INR in the last 72 hours. Orders Placed This Encounter   Procedures    Culture Blood #1     Standing Status:   Standing     Number of Occurrences:   1    Urine Culture     Standing Status:   Standing     Number of Occurrences:   1    Culture Blood #2     Standing Status:   Standing     Number of Occurrences:   1    Rapid influenza A/B antigens     Standing Status:   Standing     Number of Occurrences:   1    CT ABDOMEN PELVIS W IV CONTRAST Additional Contrast? None     If patient is on cardiac monitor and/or pulse ox, they may be taken off cardiac monitor and pulse ox, left on O2 if currently on. All monitors reattached when patient returns to room.      Standing Status:   Standing     Number of Occurrences:   1     Order Specific Question:   Additional Contrast?     Answer:   None     Order Specific Question:   Reason for exam:     Answer:   right flank pain    US RENAL COMPLETE     Standing Status:   Standing     Number of Occurrences:   1     Order Specific Question:   Reason for exam:     Answer:   R kidney abscess ?, bilateral US    CBC DISCONTD: QUEtiapine (SEROQUEL) tablet 50 mg    vancomycin (VANCOCIN) 750 mg in dextrose 5 % 250 mL IVPB    DISCONTD: HYDROcodone-acetaminophen (NORCO) 5-325 MG per tablet 1 tablet    ondansetron (ZOFRAN) 4 MG tablet     Sig: Take 1 tablet by mouth 3 times daily as needed for Nausea or Vomiting     Dispense:  15 tablet     Refill:  0    ibuprofen (ADVIL;MOTRIN) 800 MG tablet     Sig: Take 1 tablet by mouth every 8 hours as needed for Pain     Dispense:  30 tablet     Refill:  3    cefUROXime (CEFTIN) 500 MG tablet     Sig: Take 1 tablet by mouth 2 times daily for 7 days     Dispense:  14 tablet     Refill:  0           Objective:   Vitals: /62   Pulse 86   Temp 98.2 °F (36.8 °C) (Oral)   Resp 16   Wt 156 lb 8.4 oz (71 kg)   SpO2 98%   BMI 23.11 kg/m²   General appearance: alert,awake,oriented x 3 and cooperative with exam  HEENT: Head: Normal, normocephalic, atraumatic, anicteric, pupils are reactive to light. Dry mucous membrane. Neck: no adenopathy, no carotid bruit, supple, symmetrical, trachea midline and thyroid not enlarged, symmetric, no tenderness/mass/nodules  Lungs: clear  Heart: regular rate and rhythm, S1, S2 normal, no murmur, click, rub or gallop  Abdomen: soft, non-tender; bowel sounds normal; no masses,  no organomegaly  Extremities: extremities normal, Neurologic: Mental status: Alert, oriented, thought content appropriate    Assessment and Plan:   uti-better  Patient Active Problem List:     Depression     PTSD (post-traumatic stress disorder)     Moderate episode of recurrent major depressive disorder ([facility]     Acute pyelonephritis     Septicemia ([facility]     Pyelonephritis    Pt is stable. Discussed with staff and pt about the plan. See the orders for further plan. Will proceed further acc to pt's progress. Time spent with management of the pt is- 20 mts  Follow up in office in 1 wk. See the [address] [facility] rec forms  Follow up with specialists as instructed by them.   Advised the

## 2021-01-04 ENCOUNTER — HOSPITAL ENCOUNTER (EMERGENCY)
Age: 21
Discharge: HOME | End: 2021-01-04
Payer: MEDICARE

## 2021-01-04 VITALS
OXYGEN SATURATION: 98 % | HEART RATE: 87 BPM | WEIGHT: 174.38 LBS | DIASTOLIC BLOOD PRESSURE: 74 MMHG | HEIGHT: 69 IN | TEMPERATURE: 98.2 F | BODY MASS INDEX: 25.83 KG/M2 | RESPIRATION RATE: 17 BRPM | SYSTOLIC BLOOD PRESSURE: 111 MMHG

## 2021-01-04 DIAGNOSIS — N12 PYELONEPHRITIS: Primary | ICD-10-CM

## 2021-01-04 LAB
A/G RATIO: 1.1 (ref 1.1–2.2)
ALBUMIN SERPL-MCNC: 3.4 G/DL (ref 3.4–5)
ALP BLD-CCNC: 55 U/L (ref 40–129)
ALT SERPL-CCNC: 6 U/L (ref 10–40)
ANION GAP SERPL CALCULATED.3IONS-SCNC: 12 MMOL/L (ref 3–16)
AST SERPL-CCNC: 9 U/L (ref 15–37)
BACTERIA: ABNORMAL /HPF
BASOPHILS ABSOLUTE: 0 K/UL (ref 0–0.2)
BASOPHILS RELATIVE PERCENT: 0.2 %
BILIRUB SERPL-MCNC: <0.2 MG/DL (ref 0–1)
BILIRUBIN URINE: NEGATIVE
BLOOD, URINE: ABNORMAL
BUN BLDV-MCNC: 5 MG/DL (ref 7–20)
CALCIUM SERPL-MCNC: 9.1 MG/DL (ref 8.3–10.6)
CHLORIDE BLD-SCNC: 104 MMOL/L (ref 99–110)
CLARITY: ABNORMAL
CO2: 21 MMOL/L (ref 21–32)
COLOR: YELLOW
CREAT SERPL-MCNC: <0.5 MG/DL (ref 0.6–1.1)
EOSINOPHILS ABSOLUTE: 0.1 K/UL (ref 0–0.6)
EOSINOPHILS RELATIVE PERCENT: 0.7 %
EPITHELIAL CELLS, UA: 11 /HPF (ref 0–5)
GFR AFRICAN AMERICAN: >60
GFR NON-AFRICAN AMERICAN: >60
GLOBULIN: 3.2 G/DL
GLUCOSE BLD-MCNC: 86 MG/DL (ref 70–99)
GLUCOSE URINE: NEGATIVE MG/DL
HCG QUALITATIVE: POSITIVE
HCT VFR BLD CALC: 33.6 % (ref 36–48)
HEMOGLOBIN: 11.4 G/DL (ref 12–16)
HYALINE CASTS: 7 /LPF (ref 0–8)
KETONES, URINE: NEGATIVE MG/DL
LEUKOCYTE ESTERASE, URINE: ABNORMAL
LIPASE: 13 U/L (ref 13–60)
LYMPHOCYTES ABSOLUTE: 1.2 K/UL (ref 1–5.1)
LYMPHOCYTES RELATIVE PERCENT: 15.8 %
MCH RBC QN AUTO: 29.5 PG (ref 26–34)
MCHC RBC AUTO-ENTMCNC: 33.8 G/DL (ref 31–36)
MCV RBC AUTO: 87.1 FL (ref 80–100)
MICROSCOPIC EXAMINATION: YES
MONOCYTES ABSOLUTE: 0.7 K/UL (ref 0–1.3)
MONOCYTES RELATIVE PERCENT: 9.4 %
NEUTROPHILS ABSOLUTE: 5.6 K/UL (ref 1.7–7.7)
NEUTROPHILS RELATIVE PERCENT: 73.9 %
NITRITE, URINE: POSITIVE
PDW BLD-RTO: 13.5 % (ref 12.4–15.4)
PH UA: 6.5 (ref 5–8)
PLATELET # BLD: 218 K/UL (ref 135–450)
PMV BLD AUTO: 9.8 FL (ref 5–10.5)
POTASSIUM REFLEX MAGNESIUM: 3.7 MMOL/L (ref 3.5–5.1)
PROTEIN UA: 30 MG/DL
RBC # BLD: 3.86 M/UL (ref 4–5.2)
RBC UA: 200 /HPF (ref 0–4)
SODIUM BLD-SCNC: 137 MMOL/L (ref 136–145)
SPECIFIC GRAVITY UA: 1.02 (ref 1–1.03)
TOTAL PROTEIN: 6.6 G/DL (ref 6.4–8.2)
URINE REFLEX TO CULTURE: YES
URINE TYPE: ABNORMAL
UROBILINOGEN, URINE: 0.2 E.U./DL
WBC # BLD: 7.5 K/UL (ref 4–11)
WBC UA: 524 /HPF (ref 0–5)

## 2021-01-04 PROCEDURE — 83690 ASSAY OF LIPASE: CPT

## 2021-01-04 PROCEDURE — 81001 URINALYSIS AUTO W/SCOPE: CPT

## 2021-01-04 PROCEDURE — 87186 SC STD MICRODIL/AGAR DIL: CPT

## 2021-01-04 PROCEDURE — 87086 URINE CULTURE/COLONY COUNT: CPT

## 2021-01-04 PROCEDURE — 99283 EMERGENCY DEPT VISIT LOW MDM: CPT

## 2021-01-04 PROCEDURE — 87088 URINE BACTERIA CULTURE: CPT

## 2021-01-04 PROCEDURE — 96372 THER/PROPH/DIAG INJ SC/IM: CPT

## 2021-01-04 PROCEDURE — 84703 CHORIONIC GONADOTROPIN ASSAY: CPT

## 2021-01-04 PROCEDURE — 6360000002 HC RX W HCPCS: Performed by: NURSE PRACTITIONER

## 2021-01-04 PROCEDURE — 85025 COMPLETE CBC W/AUTO DIFF WBC: CPT

## 2021-01-04 PROCEDURE — 80053 COMPREHEN METABOLIC PANEL: CPT

## 2021-01-04 RX ORDER — CEPHALEXIN 500 MG/1
500 CAPSULE ORAL 4 TIMES DAILY
Qty: 56 CAPSULE | Refills: 0 | Status: SHIPPED | OUTPATIENT
Start: 2021-01-04 | End: 2021-01-18

## 2021-01-04 RX ORDER — CEFTRIAXONE 1 G/1
1 INJECTION, POWDER, FOR SOLUTION INTRAMUSCULAR; INTRAVENOUS ONCE
Status: COMPLETED | OUTPATIENT
Start: 2021-01-04 | End: 2021-01-04

## 2021-01-04 RX ADMIN — CEFTRIAXONE 1 G: 1 INJECTION, POWDER, FOR SOLUTION INTRAMUSCULAR; INTRAVENOUS at 18:54

## 2021-01-04 ASSESSMENT — PAIN SCALES - GENERAL
PAINLEVEL_OUTOF10: 6
PAINLEVEL_OUTOF10: 7

## 2021-01-04 ASSESSMENT — PAIN DESCRIPTION - ORIENTATION
ORIENTATION: RIGHT
ORIENTATION: RIGHT

## 2021-01-04 ASSESSMENT — PAIN DESCRIPTION - PAIN TYPE
TYPE: ACUTE PAIN
TYPE: ACUTE PAIN

## 2021-01-04 ASSESSMENT — PAIN DESCRIPTION - DESCRIPTORS: DESCRIPTORS: ACHING

## 2021-01-04 ASSESSMENT — PAIN DESCRIPTION - LOCATION: LOCATION: FLANK

## 2021-01-04 NOTE — ED NOTES
22 weeks pregnant. Pt with right sided flank pain since yesterday. Pt was moving furniture into new residence recently.        Maite Matthews RN  01/04/21 0844

## 2021-01-05 NOTE — ED PROVIDER NOTES
[address]  [address] [address]  Dept: [phone]  Loc: [address] ENCOUNTER        This patient was not seen or evaluated by the attending physician. I evaluated this patient, the attending physician was available for consultation. CHIEF COMPLAINT    Chief Complaint   Patient presents with    Flank Pain     R side, started yesterday, hx of kidney infections, 22wks pregnant       HPI    Kay Ying is a 23 y.o. female who presents to the emergency department with a 2 to 3-day complaint of right flank pain. She is concerned she may have a urinary tract infection. She had a kidney infection a while back with the same complaint. She otherwise has no abdominal pain, no nausea no vomiting. No dysuria, urgency, frequency or hematuria. She denies body aches, fever, chills. She is 22 weeks pregnant with her second pregnancy. She has been eating and drinking normally. She is being followed by a midwife in [address] He denies of vaginal bleeding or unusual vaginal discharge. No history of kidney stone. REVIEW OF SYSTEMS    : See HPI, denies hematuria  GI: No vomiting or diarrhea  General: No measured fevers    PAST MEDICAL & SURGICAL HISTORY    Past Medical History:   Diagnosis Date    Anxiety     Depression     Headache     Migraines      No past surgical history on file.     CURRENT MEDICATIONS  (may include discharge medications prescribed in the ED)  Current Outpatient Rx   Medication Sig Dispense Refill    cephALEXin (KEFLEX) 500 MG capsule Take 1 capsule by mouth 4 times daily for 14 days 56 capsule 0    QUEtiapine (SEROQUEL) 50 MG tablet TAKE 1 TABLET BY MOUTH EVERY DAY AT NIGHT 30 tablet 2    buPROPion (WELLBUTRIN XL) 150 MG extended release tablet TAKE 1 TABLET BY MOUTH EVERY DAY 30 tablet 2    ondansetron (ZOFRAN) 4 MG tablet Take 1 tablet by mouth 3 times daily as needed for Nausea or Vomiting 15 tablet 0       ALLERGIES    Allergies   Allergen Reactions    Pollen Extract        SOCIAL HISTORY    Social History     Socioeconomic History    Marital status: Single     Spouse name: Not on file    Number of children: Not on file    Years of education: Not on file    Highest education level: Not on file   Occupational History    Not on file   Social Needs    Financial resource strain: Not on file    Food insecurity     Worry: Not on file     Inability: Not on file    Transportation needs     Medical: Not on file     Non-medical: Not on file   Tobacco Use    Smoking status: Never Smoker    Smokeless tobacco: Never Used   Substance and Sexual Activity    Alcohol use: No    Drug use: No    Sexual activity: Yes     Partners: Male   Lifestyle    Physical activity     Days per week: Not on file     Minutes per session: Not on file    Stress: Not on file   Relationships    Social connections     Talks on phone: Not on file     Gets together: Not on file     Attends Shinto service: Not on file     Active member of club or organization: Not on file     Attends meetings of clubs or organizations: Not on file     Relationship status: Not on file    Intimate partner violence     Fear of current or ex partner: Not on file     Emotionally abused: Not on file     Physically abused: Not on file     Forced sexual activity: Not on file   Other Topics Concern    Not on file   Social History Narrative    Not on file       PHYSICAL EXAM    VITAL SIGNS: /74   Pulse 87   Temp 98.2 °F (36.8 °C) (Oral)   Resp 17   Ht 5' 9\" (1.753 m)   Wt 174 lb 6.1 oz (79.1 kg)   LMP 07/28/2020   SpO2 98%   BMI 25.75 kg/m²    Constitutional:  Well developed, well nourished  HENT:  Atraumatic,  Moist mucus membranes  EYES: Conjunctiva Moist, Nonicteric  NECK: No JVD, supple   Respiratory:  No respiratory distress. Breath sounds clear throughout auscultation. Cardiovascular: no JVD. Regular rhythm and rate, S1-S2. No murmur  Abdomen:  Soft, pregnant abdomen. Can visualize and also feel fetal movement in the abdomen. Fetal heart tones were checked in 160s. She has no rebound or guarding. Positive right-sided flank tenderness with palpation.    Integument:  Skin is warm and dry   Neurologic: Awake, alert, normal flow speech, no tremors    RADIOLOGY/PROCEDURES    No orders to display     Labs Reviewed   CBC WITH AUTO DIFFERENTIAL - Abnormal; Notable for the following components:       Result Value    RBC 3.86 (*)     Hemoglobin 11.4 (*)     Hematocrit 33.6 (*)     All other components within normal limits    Narrative:     Performed at:  [address] [employer]   Phone [phone]   COMPREHENSIVE METABOLIC PANEL W/ REFLEX TO MG FOR LOW K - Abnormal; Notable for the following components:    BUN 5 (*)     CREATININE <0.5 (*)     ALT 6 (*)     AST 9 (*)     All other components within normal limits    Narrative:     Performed at:  [address] [employer]   Phone [phone]   URINE RT REFLEX TO CULTURE - Abnormal; Notable for the following components:    Clarity, UA TURBID (*)     Blood, Urine SMALL (*)     Protein, UA 30 (*)     Nitrite, Urine POSITIVE (*)     Leukocyte Esterase, Urine LARGE (*)     All other components within normal limits    Narrative:     Performed at:  [place] [address] [address] [facility] [employer]   Phone [phone]   MICROSCOPIC URINALYSIS - Abnormal; Notable for the following components:    Bacteria, UA 4+ (*)     WBC,  (*)     RBC,  (*)     Epithelial Cells, UA 11 (*)     All other components within normal limits    Narrative:     Performed at:  [place] [address] [address] [facility] [employer]   Phone [phone]   CULTURE, URINE   HCG, was given strict ED return precautions for any signs of worsening symptoms including but not limited to development of fever, chills, body aches, worsening flank pain, any development of urinary symptoms, nausea, vomiting or abdominal pain. I had a lengthy conversation again with the patient regarding the fact that she needs to have very close follow-up with her midwife within the next 48 hours. If she cannot get into see her midwife then she needs to come back to the emergency department for reevaluation. The patient verbalized understanding of the discharge instructions and she ambulated out of the emergency department with a steady gait. FINAL IMPRESSION    1.  Pyelonephritis        PLAN  Discharge with outpatient followup, instructions and medication (see EMR)     (Please note that this note was completed with a voice recognition program.  Every attempt was made to edit the dictations, but inevitably there remain words that are mis-transcribed.)                  CHRIST Lei - BERONICA  01/04/21 2040

## 2021-01-06 LAB
ORGANISM: ABNORMAL
URINE CULTURE, ROUTINE: ABNORMAL

## 2021-02-07 ENCOUNTER — HOSPITAL ENCOUNTER (OUTPATIENT)
Age: 21
Discharge: HOME | End: 2021-02-08
Attending: OBSTETRICS & GYNECOLOGY | Admitting: OBSTETRICS & GYNECOLOGY
Payer: MEDICARE

## 2021-02-07 VITALS
WEIGHT: 176 LBS | HEIGHT: 69 IN | DIASTOLIC BLOOD PRESSURE: 60 MMHG | SYSTOLIC BLOOD PRESSURE: 111 MMHG | RESPIRATION RATE: 18 BRPM | TEMPERATURE: 98.8 F | HEART RATE: 88 BPM | BODY MASS INDEX: 26.07 KG/M2

## 2021-02-07 PROCEDURE — 87086 URINE CULTURE/COLONY COUNT: CPT

## 2021-02-07 PROCEDURE — 81001 URINALYSIS AUTO W/SCOPE: CPT

## 2021-02-08 PROBLEM — Z87.59 HISTORY OF PYELONEPHRITIS DURING PREGNANCY: Status: ACTIVE | Noted: 2021-02-08

## 2021-02-08 PROBLEM — O23.12 ACUTE CYSTITIS DURING PREGNANCY IN SECOND TRIMESTER: Status: ACTIVE | Noted: 2021-02-08

## 2021-02-08 PROBLEM — Z3A.27 27 WEEKS GESTATION OF PREGNANCY: Status: ACTIVE | Noted: 2021-02-08

## 2021-02-08 PROBLEM — O09.93 SUPERVISION OF HIGH RISK PREGNANCY IN THIRD TRIMESTER: Status: ACTIVE | Noted: 2021-02-08

## 2021-02-08 PROBLEM — Z87.440 HISTORY OF PYELONEPHRITIS DURING PREGNANCY: Status: ACTIVE | Noted: 2021-02-08

## 2021-02-08 LAB
BILIRUBIN URINE: NEGATIVE
BLOOD, URINE: ABNORMAL
CLARITY: ABNORMAL
COLOR: ABNORMAL
EPITHELIAL CELLS, UA: 9 /HPF (ref 0–5)
GLUCOSE URINE: NEGATIVE MG/DL
HYALINE CASTS: 1 /LPF (ref 0–8)
KETONES, URINE: ABNORMAL MG/DL
LEUKOCYTE ESTERASE, URINE: ABNORMAL
MICROSCOPIC EXAMINATION: YES
NITRITE, URINE: NEGATIVE
PH UA: 6.5 (ref 5–8)
PROTEIN UA: 30 MG/DL
RBC UA: >900 /HPF (ref 0–4)
SPECIFIC GRAVITY UA: 1.02 (ref 1–1.03)
URINE CULTURE, ROUTINE: NORMAL
URINE TYPE: ABNORMAL
UROBILINOGEN, URINE: 1 E.U./DL
WBC UA: 18 /HPF (ref 0–5)

## 2021-02-08 PROCEDURE — 99202 OFFICE O/P NEW SF 15 MIN: CPT

## 2021-02-08 PROCEDURE — 59025 FETAL NON-STRESS TEST: CPT

## 2021-02-08 RX ORDER — NITROFURANTOIN 25; 75 MG/1; MG/1
100 CAPSULE ORAL 2 TIMES DAILY
Qty: 14 CAPSULE | Refills: 0 | Status: SHIPPED | OUTPATIENT
Start: 2021-02-08 | End: 2021-02-15

## 2021-02-08 RX ORDER — CEPHALEXIN 500 MG/1
500 CAPSULE ORAL DAILY
Qty: 30 CAPSULE | Refills: 1 | Status: SHIPPED | OUTPATIENT
Start: 2021-02-08 | End: 2022-10-10

## 2021-02-08 NOTE — PROGRESS NOTES
Department of Obstetrics and Gynecology  Triage Evaluation Note    SUBJECTIVE:  Narayan Lacy is a 19 y.o.,  at 27w4d who presents for gross hematuria noted this evening. History of pyelonephritis treated one month ago for which she received ceftriaxone and Keflex. She has not been on suppression since that time and reports that she has been taking cranberry supplements at the direction of her midwife. She denies vaginal bleeding, leakage of fluid, or contractions. She states the baby is moving well. She denies fever, chills, back pain, shortness of breath, palpitations, nausea, vomiting, diarrhea on ROS. I personally reviewed the past medical and surgical histories, as well as the problem list.    OBJECTIVE:  Vital Signs: /60   Pulse 88   Temp 98.8 °F (37.1 °C) (Oral)   Resp 18   Ht 5' 9\" (1.753 m)   Wt 176 lb (79.8 kg)   LMP 2020   BMI 25.99 kg/m²   Appearance/Psychiatric: alert and oriented X3  Constitutional: Appears well, no distress  Cardiovascular: She does not have edema. Respiratory: Respiratory effort is normal.  Gastrointestinal: soft, non tender, Gravid. The uterine size measures 28cm. Pelvic: No blood on speculum exam. Cervix closed/thick. Back: No CVA tenderness. NST read: baseline 145, mod donnell, non-reactive, appropriate for gestational age 32 weeks  Callahan: no contraction    LABS:    UA notable for large amount of blood, proteinuria, and moderate LE    ASSESSMENT AND PLAN:  19 y.o. Rebeca Dalal presenting with hematuria consistent with acute cystitis. She had pyelonephritis one month ago with culture + for E. Coli at that time. Will treat empirically with Macrobid based on that culture. Discussed history of pyelonephritis and recommendation for suppression through pregnancy until 6 weeks postpartum. Reviewed increased risk of recurrent UTI and development of pyelonephritis in pregnancy after a first episode. Pt expresses understanding. Macrobid sent.  Begin Keflex suppression following completion of Macrobid course. Follow up with primary OB provider for further care. Return for fever, chills, back pain, n/v, contractions, vaginal bleeding, or decreased fetal movement. Questions were encouraged and answered. Pt expresses understanding and comfort with plan of care.      Patient Active Problem List    Diagnosis Date Noted    Acute cystitis during pregnancy in second trimester 02/08/2021    Supervision of high risk pregnancy in third trimester 02/08/2021    27 weeks gestation of pregnancy 02/08/2021    History of pyelonephritis during pregnancy 02/08/2021       Electronically signed by Sergio Ballesteros MD on 2/8/2021 at 12:41 AM

## 2021-02-08 NOTE — FLOWSHEET NOTE
Pt presents to triage A reports blood in urine. Denies any pain and urinary symptoms. Denies fever and chills. Denies any leaking of fluid, vaginal bleeding with abdomen soft, no fluid on underwear upon arrival.   Pt placed on central banking UA and toco monitors.

## 2021-02-08 NOTE — FLOWSHEET NOTE
Pt admitted to triage A. Pt able to void. Urine blood tinge in color, no odor. UA and urine culture labs sent. Will continue to monitor.

## 2021-02-08 NOTE — FLOWSHEET NOTE
02/07/21 2300   Fetal Heart Rate   Mode External US   Baseline Rate 145 bpm   Baseline Classification Normal   Variability 6-25 BPM   Interventions RN at Bedside; Weed Adjusted;Ultrasound Adjusted   OB Bladder Status Non-distended   OB Bladder Intervention Voiding with Relief   Fetal Monitoring Strip   FMS Reviewed?  Yes   FMS Reviewed By? CS   Uterine Activity   Mode Weed;Palpation   Contraction Frequency none   Resting Tone Palpated Soft

## 2021-02-08 NOTE — FLOWSHEET NOTE
Dr. Lynn Shepherd and RN at bedside. Speculum exam preformed and SVE closed and thick. Discussing antibiotics and follow up with provider. All questions answered.

## 2022-01-19 ENCOUNTER — HOSPITAL ENCOUNTER (EMERGENCY)
Age: 22
Discharge: HOME | End: 2022-01-19
Payer: MEDICARE

## 2022-01-19 VITALS
HEIGHT: 69 IN | TEMPERATURE: 97.9 F | BODY MASS INDEX: 24.59 KG/M2 | DIASTOLIC BLOOD PRESSURE: 78 MMHG | SYSTOLIC BLOOD PRESSURE: 123 MMHG | HEART RATE: 99 BPM | RESPIRATION RATE: 18 BRPM | OXYGEN SATURATION: 99 % | WEIGHT: 166.01 LBS

## 2022-01-19 DIAGNOSIS — N10 ACUTE PYELONEPHRITIS: Primary | ICD-10-CM

## 2022-01-19 LAB
BACTERIA: ABNORMAL /HPF
BILIRUBIN URINE: NEGATIVE
BLOOD, URINE: ABNORMAL
CLARITY: ABNORMAL
COLOR: YELLOW
EPITHELIAL CELLS, UA: 3 /HPF (ref 0–5)
GLUCOSE URINE: NEGATIVE MG/DL
HCG(URINE) PREGNANCY TEST: NEGATIVE
HYALINE CASTS: 2 /LPF (ref 0–8)
KETONES, URINE: ABNORMAL MG/DL
LEUKOCYTE ESTERASE, URINE: ABNORMAL
MICROSCOPIC EXAMINATION: YES
NITRITE, URINE: NEGATIVE
PH UA: 6.5 (ref 5–8)
PROTEIN UA: 30 MG/DL
RBC UA: 43 /HPF (ref 0–4)
SPECIFIC GRAVITY UA: 1.01 (ref 1–1.03)
URINE REFLEX TO CULTURE: YES
URINE TYPE: ABNORMAL
UROBILINOGEN, URINE: 0.2 E.U./DL
WBC UA: >900 /HPF (ref 0–5)

## 2022-01-19 PROCEDURE — 81001 URINALYSIS AUTO W/SCOPE: CPT

## 2022-01-19 PROCEDURE — 84703 CHORIONIC GONADOTROPIN ASSAY: CPT

## 2022-01-19 PROCEDURE — 87086 URINE CULTURE/COLONY COUNT: CPT

## 2022-01-19 PROCEDURE — 87186 SC STD MICRODIL/AGAR DIL: CPT

## 2022-01-19 PROCEDURE — 99282 EMERGENCY DEPT VISIT SF MDM: CPT

## 2022-01-19 PROCEDURE — 6370000000 HC RX 637 (ALT 250 FOR IP): Performed by: NURSE PRACTITIONER

## 2022-01-19 PROCEDURE — 87088 URINE BACTERIA CULTURE: CPT

## 2022-01-19 RX ORDER — ONDANSETRON 4 MG/1
4 TABLET, ORALLY DISINTEGRATING ORAL EVERY 8 HOURS PRN
Qty: 12 TABLET | Refills: 0 | Status: SHIPPED | OUTPATIENT
Start: 2022-01-19 | End: 2022-01-22

## 2022-01-19 RX ORDER — HYDROCODONE BITARTRATE AND ACETAMINOPHEN 5; 325 MG/1; MG/1
1 TABLET ORAL EVERY 6 HOURS PRN
Qty: 12 TABLET | Refills: 0 | Status: SHIPPED | OUTPATIENT
Start: 2022-01-19 | End: 2022-01-22

## 2022-01-19 RX ORDER — ONDANSETRON 4 MG/1
4 TABLET, ORALLY DISINTEGRATING ORAL ONCE
Status: COMPLETED | OUTPATIENT
Start: 2022-01-19 | End: 2022-01-19

## 2022-01-19 RX ORDER — HYDROCODONE BITARTRATE AND ACETAMINOPHEN 5; 325 MG/1; MG/1
1 TABLET ORAL ONCE
Status: COMPLETED | OUTPATIENT
Start: 2022-01-19 | End: 2022-01-19

## 2022-01-19 RX ORDER — CEFUROXIME AXETIL 250 MG/1
250 TABLET ORAL 2 TIMES DAILY
Qty: 14 TABLET | Refills: 0 | Status: SHIPPED | OUTPATIENT
Start: 2022-01-19 | End: 2022-01-26

## 2022-01-19 RX ORDER — CEFUROXIME AXETIL 250 MG/1
250 TABLET ORAL ONCE
Status: COMPLETED | OUTPATIENT
Start: 2022-01-19 | End: 2022-01-19

## 2022-01-19 RX ADMIN — CEFUROXIME AXETIL 250 MG: 250 TABLET ORAL at 15:44

## 2022-01-19 RX ADMIN — ONDANSETRON 4 MG: 4 TABLET, ORALLY DISINTEGRATING ORAL at 15:44

## 2022-01-19 RX ADMIN — HYDROCODONE BITARTRATE AND ACETAMINOPHEN 1 TABLET: 5; 325 TABLET ORAL at 15:44

## 2022-01-19 ASSESSMENT — PAIN DESCRIPTION - PAIN TYPE: TYPE: ACUTE PAIN

## 2022-01-19 ASSESSMENT — PAIN SCALES - GENERAL: PAINLEVEL_OUTOF10: 8

## 2022-01-19 ASSESSMENT — PAIN DESCRIPTION - LOCATION: LOCATION: FLANK

## 2022-01-21 LAB
ORGANISM: ABNORMAL
URINE CULTURE, ROUTINE: ABNORMAL

## 2022-01-24 NOTE — ED PROVIDER NOTES
tablet 0       ALLERGIES    Allergies   Allergen Reactions    Pollen Extract        SOCIAL HISTORY    Social History     Socioeconomic History    Marital status: Single     Spouse name: Not on file    Number of children: Not on file    Years of education: Not on file    Highest education level: Not on file   Occupational History    Not on file   Tobacco Use    Smoking status: Never Smoker    Smokeless tobacco: Never Used   Vaping Use    Vaping Use: Never used   Substance and Sexual Activity    Alcohol use: No    Drug use: No    Sexual activity: Yes     Partners: Male   Other Topics Concern    Not on file   Social History Narrative    Not on file     Social Determinants of Health     Financial Resource Strain:     Difficulty of Paying Living Expenses: Not on file   Food Insecurity:     Worried About Running Out of Food in the Last Year: Not on file    Maribel of Food in the Last Year: Not on file   Transportation Needs:     Lack of Transportation (Medical): Not on file    Lack of Transportation (Non-Medical):  Not on file   Physical Activity:     Days of Exercise per Week: Not on file    Minutes of Exercise per Session: Not on file   Stress:     Feeling of Stress : Not on file   Social Connections:     Frequency of Communication with Friends and Family: Not on file    Frequency of Social Gatherings with Friends and Family: Not on file    Attends Temple Services: Not on file    Active Member of [address] [place] [address] [employer] or Organizations: Not on file    Attends Club or Organization Meetings: Not on file    Marital Status: Not on file   Intimate Partner Violence:     Fear of Current or Ex-Partner: Not on file    Emotionally Abused: Not on file    Physically Abused: Not on file    Sexually Abused: Not on file   Housing Stability:     Unable to Pay for Housing in the Last Year: Not on file    Number of [place] in the Last Year: Not on file    Unstable Housing in the Last Year: Not on file PHYSICAL EXAM    VITAL SIGNS: /78   Pulse 99   Temp 97.9 °F (36.6 °C) (Oral)   Resp 18   Ht 5' 9\" (1.753 m)   Wt 166 lb 0.1 oz (75.3 kg)   SpO2 99%   BMI 24.51 kg/m²    Constitutional:  Well developed, well nourished  HENT:  Atraumatic, moist mucus membranes  EYES: Conjunctiva moist, nonicteric  NECK: No JVD, supple   Respiratory:  No respiratory distress. Breath sounds clear throughout auscultation. Respirations are even and unlabored. No cough noted. On room air. Cardiovascular: no JVD regular rhythm and rate, S1-S2. No murmurs. Abdomen:  Soft, nontender nondistended abdomen without rebound or guarding. Normoactive bowel sounds throughout auscultation. Right CVA tenderness with palpation. Integument:  Skin is warm and dry   Neurologic: Awake, alert, normal flow speech, no tremors    RADIOLOGY/PROCEDURES    No orders to display       ED COURSE & MEDICAL DECISION MAKING    Pertinent Labs studies interpreted and reviewed. (See chart for details)  See chart for details of medications given during the ED stay. Medications   HYDROcodone-acetaminophen (NORCO) 5-325 MG per tablet 1 tablet (1 tablet Oral Given 1/19/22 1544)   cefUROXime (CEFTIN) tablet 250 mg (250 mg Oral Given 1/19/22 1544)   ondansetron (ZOFRAN-ODT) disintegrating tablet 4 mg (4 mg Oral Given 1/19/22 1544)     I have seen and evaluated this patient. My attending physician was available for consultation. Frontal diagnosis includes was not limited to dehydration, urinary tract infection, pyelonephritis, sepsis, other. Nontoxic in appearance and hemodynamically stable. Urinalysis reveals moderate blood with negative nitrites and large leukocytes with greater than 900 whites and 3 epithelials. Urine pregnancy is negative. She is having flank pain therefore she clinically is presenting with pyelonephritis. Patient states she has been eating and drinking normally without vomiting or nausea pneumonia.   However I did give her Zofran because she gets nauseated when taking antibiotics. Gave her Norco for pain and Ceftin for antibiotics. She took the medications prior to leaving the ED and drink a glass of water with no vomiting. I think she can follow-up with her PCP in the next couple of days and instructed her to return to the emergency department for worsening symptoms or for vomiting after taking her antibiotics. Patient verbalized understanding of the discharge instructions. FINAL IMPRESSION    1.  Acute pyelonephritis        PLAN  Discharge with outpatient followup, instructions and medication (see EMR)     (Please note that this note was completed with a voice recognition program.  Every attempt was made to edit the dictations, but inevitably there remain words that are mis-transcribed.)                  Mary Anne Perez, CHRIST - CNP  01/24/22 5525

## 2022-05-17 ENCOUNTER — HOSPITAL ENCOUNTER (EMERGENCY)
Age: 22
Discharge: HOME | End: 2022-05-17
Payer: MEDICARE

## 2022-05-17 VITALS
OXYGEN SATURATION: 98 % | TEMPERATURE: 97.6 F | SYSTOLIC BLOOD PRESSURE: 102 MMHG | HEART RATE: 82 BPM | BODY MASS INDEX: 22.24 KG/M2 | DIASTOLIC BLOOD PRESSURE: 67 MMHG | WEIGHT: 150.57 LBS | RESPIRATION RATE: 18 BRPM

## 2022-05-17 DIAGNOSIS — N10 ACUTE PYELONEPHRITIS: Primary | ICD-10-CM

## 2022-05-17 LAB
ALBUMIN SERPL-MCNC: 4.1 G/DL (ref 3.4–5)
ALP BLD-CCNC: 77 U/L (ref 40–129)
ALT SERPL-CCNC: 11 U/L (ref 10–40)
ANION GAP SERPL CALCULATED.3IONS-SCNC: 14 MMOL/L (ref 3–16)
AST SERPL-CCNC: 13 U/L (ref 15–37)
BACTERIA: ABNORMAL /HPF
BASOPHILS ABSOLUTE: 0 K/UL (ref 0–0.2)
BASOPHILS RELATIVE PERCENT: 0.3 %
BILIRUB SERPL-MCNC: 0.3 MG/DL (ref 0–1)
BILIRUBIN DIRECT: <0.2 MG/DL (ref 0–0.3)
BILIRUBIN URINE: NEGATIVE
BILIRUBIN, INDIRECT: ABNORMAL MG/DL (ref 0–1)
BLOOD, URINE: ABNORMAL
BUN BLDV-MCNC: 9 MG/DL (ref 7–20)
CALCIUM SERPL-MCNC: 9.4 MG/DL (ref 8.3–10.6)
CHLORIDE BLD-SCNC: 101 MMOL/L (ref 99–110)
CLARITY: ABNORMAL
CO2: 20 MMOL/L (ref 21–32)
COLOR: YELLOW
CREAT SERPL-MCNC: 0.6 MG/DL (ref 0.6–1.1)
EOSINOPHILS ABSOLUTE: 0 K/UL (ref 0–0.6)
EOSINOPHILS RELATIVE PERCENT: 0.5 %
EPITHELIAL CELLS, UA: 12 /HPF (ref 0–5)
GFR AFRICAN AMERICAN: >60
GFR NON-AFRICAN AMERICAN: >60
GLUCOSE BLD-MCNC: 67 MG/DL (ref 70–99)
GLUCOSE URINE: NEGATIVE MG/DL
HCG(URINE) PREGNANCY TEST: NEGATIVE
HCT VFR BLD CALC: 38.8 % (ref 36–48)
HEMOGLOBIN: 12.9 G/DL (ref 12–16)
HYALINE CASTS: 1 /LPF (ref 0–8)
KETONES, URINE: 40 MG/DL
LEUKOCYTE ESTERASE, URINE: ABNORMAL
LYMPHOCYTES ABSOLUTE: 0.9 K/UL (ref 1–5.1)
LYMPHOCYTES RELATIVE PERCENT: 22.8 %
MCH RBC QN AUTO: 28.1 PG (ref 26–34)
MCHC RBC AUTO-ENTMCNC: 33.3 G/DL (ref 31–36)
MCV RBC AUTO: 84.4 FL (ref 80–100)
MICROSCOPIC EXAMINATION: YES
MONOCYTES ABSOLUTE: 0.6 K/UL (ref 0–1.3)
MONOCYTES RELATIVE PERCENT: 15.2 %
NEUTROPHILS ABSOLUTE: 2.3 K/UL (ref 1.7–7.7)
NEUTROPHILS RELATIVE PERCENT: 61.2 %
NITRITE, URINE: NEGATIVE
PDW BLD-RTO: 15.1 % (ref 12.4–15.4)
PH UA: 5.5 (ref 5–8)
PLATELET # BLD: 219 K/UL (ref 135–450)
PMV BLD AUTO: 8.7 FL (ref 5–10.5)
POTASSIUM REFLEX MAGNESIUM: 3.6 MMOL/L (ref 3.5–5.1)
PROTEIN UA: ABNORMAL MG/DL
RAPID INFLUENZA  B AGN: NEGATIVE
RAPID INFLUENZA A AGN: NEGATIVE
RBC # BLD: 4.59 M/UL (ref 4–5.2)
RBC UA: 521 /HPF (ref 0–4)
SARS-COV-2, NAAT: NOT DETECTED
SODIUM BLD-SCNC: 135 MMOL/L (ref 136–145)
SPECIFIC GRAVITY UA: 1.02 (ref 1–1.03)
TOTAL PROTEIN: 7.7 G/DL (ref 6.4–8.2)
URINE REFLEX TO CULTURE: YES
URINE TYPE: ABNORMAL
UROBILINOGEN, URINE: 1 E.U./DL
WBC # BLD: 3.8 K/UL (ref 4–11)
WBC UA: 16 /HPF (ref 0–5)

## 2022-05-17 PROCEDURE — 87804 INFLUENZA ASSAY W/OPTIC: CPT

## 2022-05-17 PROCEDURE — 80076 HEPATIC FUNCTION PANEL: CPT

## 2022-05-17 PROCEDURE — 6370000000 HC RX 637 (ALT 250 FOR IP): Performed by: NURSE PRACTITIONER

## 2022-05-17 PROCEDURE — 99283 EMERGENCY DEPT VISIT LOW MDM: CPT

## 2022-05-17 PROCEDURE — 84703 CHORIONIC GONADOTROPIN ASSAY: CPT

## 2022-05-17 PROCEDURE — 87077 CULTURE AEROBIC IDENTIFY: CPT

## 2022-05-17 PROCEDURE — 81001 URINALYSIS AUTO W/SCOPE: CPT

## 2022-05-17 PROCEDURE — 85025 COMPLETE CBC W/AUTO DIFF WBC: CPT

## 2022-05-17 PROCEDURE — 87186 SC STD MICRODIL/AGAR DIL: CPT

## 2022-05-17 PROCEDURE — 80048 BASIC METABOLIC PNL TOTAL CA: CPT

## 2022-05-17 PROCEDURE — 87635 SARS-COV-2 COVID-19 AMP PRB: CPT

## 2022-05-17 PROCEDURE — 87086 URINE CULTURE/COLONY COUNT: CPT

## 2022-05-17 RX ORDER — ONDANSETRON 4 MG/1
4-8 TABLET, ORALLY DISINTEGRATING ORAL EVERY 12 HOURS PRN
Qty: 12 TABLET | Refills: 0 | Status: SHIPPED | OUTPATIENT
Start: 2022-05-17

## 2022-05-17 RX ORDER — ONDANSETRON 4 MG/1
4 TABLET, ORALLY DISINTEGRATING ORAL ONCE
Status: COMPLETED | OUTPATIENT
Start: 2022-05-17 | End: 2022-05-17

## 2022-05-17 RX ORDER — CEFIXIME 400 MG/1
400 CAPSULE ORAL ONCE
Status: DISCONTINUED | OUTPATIENT
Start: 2022-05-17 | End: 2022-05-17

## 2022-05-17 RX ORDER — ACETAMINOPHEN 500 MG
1000 TABLET ORAL ONCE
Status: COMPLETED | OUTPATIENT
Start: 2022-05-17 | End: 2022-05-17

## 2022-05-17 RX ORDER — CEFIXIME 400 MG/1
400 CAPSULE ORAL DAILY
Qty: 10 CAPSULE | Refills: 0 | Status: SHIPPED | OUTPATIENT
Start: 2022-05-17 | End: 2022-10-10

## 2022-05-17 RX ADMIN — ONDANSETRON 4 MG: 4 TABLET, ORALLY DISINTEGRATING ORAL at 12:19

## 2022-05-17 RX ADMIN — ACETAMINOPHEN 1000 MG: 500 TABLET ORAL at 12:19

## 2022-05-17 ASSESSMENT — PAIN DESCRIPTION - LOCATION: LOCATION: BACK

## 2022-05-17 ASSESSMENT — PAIN - FUNCTIONAL ASSESSMENT
PAIN_FUNCTIONAL_ASSESSMENT: PREVENTS OR INTERFERES SOME ACTIVE ACTIVITIES AND ADLS
PAIN_FUNCTIONAL_ASSESSMENT: 0-10

## 2022-05-17 ASSESSMENT — PAIN DESCRIPTION - ORIENTATION: ORIENTATION: RIGHT;LEFT

## 2022-05-17 ASSESSMENT — PAIN SCALES - GENERAL: PAINLEVEL_OUTOF10: 8

## 2022-05-17 ASSESSMENT — PAIN DESCRIPTION - DESCRIPTORS: DESCRIPTORS: ACHING

## 2022-05-17 NOTE — ED PROVIDER NOTES
[address] [facility]  [address] [address]  Dept: [phone]  Loc: [address] ENCOUNTER        This patient was not seen or evaluated by the attending physician. I evaluated this patient, the attending physician was available for consultation. CHIEF COMPLAINT    Chief Complaint   Patient presents with    Back Pain     bilateral lower back pain, HA, body aches. onset yesterday. nausea sans emesis. urinary frequency. fevers at home       HPI    Hector Harper is a 23 y.o. nontoxic, well-appearing but distressed female with a medical history including, but not limited to, pyelonephritis, anxiety, depression, and headaches/migraines who presents with bilateral flank pain >on left flank described as \"dull and sometimes sharp\" with a severity of 8/10. Onset was yesterday. The duration has been constant since the onset. The patient has had associated nausea, \"pressure\" 5/10 right-sided headache, body \"aches\" rated severity of 4/10, nausea, lightheadedness, decreased appetite, diarrhea x1 today, subjective fever, chills, diaphoresis, urinary frequency, and urgency. Denies chest pain, vomiting, dizziness, shortness of breath, cough, change in ability to smell/taste, hemoptysis, leg/calf pain or swelling, abdominal pain, vaginal bleeding/discharge, dysuria, or urinary retention. LMP 4/15/2022. Patient is COVID-19 vaccinated. No booster shot or influenza vaccination this year and denies sick contacts. There are no aggravating or alleviating factors. Patient endorses that these are very similar symptoms that she has each time she has a kidney infection. Review of Systems   Constitutional: Positive for appetite change, chills, diaphoresis and fever (Subjective). Negative for fatigue. HENT: Negative for congestion and sore throat. Eyes: Negative for pain and visual disturbance.    Respiratory: Negative for cough and shortness of breath. Cardiovascular: Negative for chest pain and leg swelling. Gastrointestinal: Positive for diarrhea (X1 today) and nausea. Negative for abdominal pain, anal bleeding and vomiting. Genitourinary: Positive for flank pain (Bilateral >on left), frequency and urgency. Negative for difficulty urinating and dysuria. Musculoskeletal: Positive for arthralgias and myalgias. Negative for back pain and neck pain. Skin: Negative for rash and wound. Neurological: Positive for light-headedness and headaches. Negative for dizziness and syncope (+ Presyncope). Hematological: Negative. Psychiatric/Behavioral: Negative. PAST MEDICAL & SURGICAL HISTORY    Past Medical History:   Diagnosis Date    Anxiety     Depression     Headache     Migraines      No past surgical history on file. CURRENT MEDICATIONS  (may include discharge medications prescribed in the ED)  Current Outpatient Rx   Medication Sig Dispense Refill    cefixime (SUPRAX) 400 MG CAPS capsule Take 1 capsule by mouth daily 10 capsule 0    cephALEXin (KEFLEX) 500 MG capsule Take 1 capsule by mouth daily Begin after completion of nitrofurantoin.  30 capsule 1    Prenatal MV-Min-Fe Fum-FA-DHA (PRENATAL 1 PO) Take by mouth      QUEtiapine (SEROQUEL) 50 MG tablet TAKE 1 TABLET BY MOUTH EVERY DAY AT NIGHT 30 tablet 2    buPROPion (WELLBUTRIN XL) 150 MG extended release tablet TAKE 1 TABLET BY MOUTH EVERY DAY 30 tablet 2    ondansetron (ZOFRAN) 4 MG tablet Take 1 tablet by mouth 3 times daily as needed for Nausea or Vomiting 15 tablet 0       ALLERGIES    Allergies   Allergen Reactions    Pollen Extract        SOCIAL HISTORY    Social History     Socioeconomic History    Marital status: Single     Spouse name: Not on file    Number of children: Not on file    Years of education: Not on file    Highest education level: Not on file   Occupational History    Not on file   Tobacco Use    Smoking status: Never Smoker    Smokeless tobacco: Never Used   Vaping Use    Vaping Use: Never used   Substance and Sexual Activity    Alcohol use: No    Drug use: No    Sexual activity: Yes     Partners: Male   Other Topics Concern    Not on file   Social History Narrative    Not on file     Social Determinants of Health     Financial Resource Strain:     Difficulty of Paying Living Expenses: Not on file   Food Insecurity:     Worried About Running Out of Food in the Last Year: Not on file    Maribel of Food in the Last Year: Not on file   Transportation Needs:     Lack of Transportation (Medical): Not on file    Lack of Transportation (Non-Medical):  Not on file   Physical Activity:     Days of Exercise per Week: Not on file    Minutes of Exercise per Session: Not on file   Stress:     Feeling of Stress : Not on file   Social Connections:     Frequency of Communication with Friends and Family: Not on file    Frequency of Social Gatherings with Friends and Family: Not on file    Attends Yarsanism Services: Not on file    Active Member of [address] [place] [address] or Organizations: Not on file    Attends Club or Organization Meetings: Not on file    Marital Status: Not on file   Intimate Partner Violence:     Fear of Current or Ex-Partner: Not on file    Emotionally Abused: Not on file    Physically Abused: Not on file    Sexually Abused: Not on file   Housing Stability:     Unable to Pay for Housing in the Last Year: Not on file    Number of [place] in the Last Year: Not on file    Unstable Housing in the Last Year: Not on file       PHYSICAL EXAM    VITAL SIGNS: /68   Pulse 87   Temp 97.6 °F (36.4 °C) (Tympanic)   Resp 17   Wt 150 lb 9.2 oz (68.3 kg)   SpO2 100%   BMI 22.24 kg/m²    Constitutional:  Well developed, well nourished, patient appears uncomfortable  HENT:  Atraumatic, moist mucus membranes  Neck: No JVD, supple   Respiratory:  Lungs clear to auscultation bilaterally, no respiratory distress  Cardiovascular:  regular rate,  no murmurs   GI:  Soft, no abdominal tenderness, no pulsatile masses  Musculoskeletal:  No edema, no acute deformities  Back: no soft tissue swelling, + bilateral >on left CVA tenderness   Vascular: DP pulses 2+ and equal bilaterally  Integument:  Skin is warm and dry   Neurologic: Awake, alert and oriented, motor 5/5 bilaterally, patellar reflexes are 2+ and equal bilaterally, sensation to light touch is intact in the groin and lower extremities bilaterally    RADIOLOGY/PROCEDURES    No orders to display       ED COURSE & MEDICAL DECISION MAKING    Pertinent Labs & Imaging studies interpreted and reviewed. (See chart for details)  See chart for details of medications given during the ED stay. Vitals:    05/17/22 1130 05/17/22 1142   BP:  104/68   Pulse:  87   Resp:  17   Temp:  97.6 °F (36.4 °C)   TempSrc:  Tympanic   SpO2:  100%   Weight: 150 lb 9.2 oz (68.3 kg)        Differential Diagnosis: Kidney Stone, Renal Artery Aneurysm, Abdominal Aortic Aneurysm, Metastases to back, Mechanical Back Pain, Cauda Equina Syndrome, sepsis, other    Patient with a history of pyelonephritis presents emerged department with similar symptoms associated with kidney infections. I will obtain basic and abdominal labs, microscopic urinalysis, urine pregnancy, COVID-19 and influenza testing. A discussion was had with patient regarding need for imaging of abdomen pelvis via CT. Patient declines imaging. I explained to the patient my concern for possible kidney stone with her presenting symptoms. Patient declines. Patient was medicated for pain and nausea with Zofran and Tylenol. No NSAIDs until pregnancy test is resulted.     Labs reviewed:  I have reviewed and interpreted all of the currently available lab results from this visit:  Results for orders placed or performed during the hospital encounter of 05/17/22   COVID-19, Rapid    Specimen: Nasopharyngeal Swab   Result Value Ref Range SARS-CoV-2, NAAT Not Detected Not Detected   Rapid influenza A/B antigens    Specimen: Nasopharyngeal   Result Value Ref Range    Rapid Influenza A Ag Negative Negative    Rapid Influenza B Ag Negative Negative   Culture, Urine    Specimen: Urine, clean catch   Result Value Ref Range    Organism Escherichia coli ESBL (A)     Urine Culture, Routine (A)      >100,000 CFU/ml  CONTACT PRECAUTIONS INDICATED  Carbapenem antibiotics are the best option for infections caused  by ESBL producing organisms. Other antibiotic classes are  likely to result in treatment failure, even for organisms  demonstrating in vitro susceptibility.          Susceptibility    Escherichia coli (esbl) - BACTERIAL SUSCEPTIBILITY PANEL BY SCOTTIE     ampicillin >=32 Resistant mcg/mL     ampicillin-sulbactam 8 Sensitive mcg/mL     ceFAZolin >=64 Resistant mcg/mL     cefepime 16 Resistant mcg/mL     cefTRIAXone >=64 Resistant mcg/mL     ciprofloxacin <=0.25 Sensitive mcg/mL     ertapenem <=0.12 Sensitive mcg/mL     gentamicin <=1 Sensitive mcg/mL     levofloxacin <=0.12 Sensitive mcg/mL     nitrofurantoin 64 Intermediate mcg/mL     trimethoprim-sulfamethoxazole >=320 Resistant mcg/mL   Urinalysis with Reflex to Culture    Specimen: Urine   Result Value Ref Range    Color, UA Yellow Straw/Yellow    Clarity, UA CLOUDY (A) Clear    Glucose, Ur Negative Negative mg/dL    Bilirubin Urine Negative Negative    Ketones, Urine 40 (A) Negative mg/dL    Specific Gravity, UA 1.022 1.005 - 1.030    Blood, Urine LARGE (A) Negative    pH, UA 5.5 5.0 - 8.0    Protein, UA TRACE (A) Negative mg/dL    Urobilinogen, Urine 1.0 <2.0 E.U./dL    Nitrite, Urine Negative Negative    Leukocyte Esterase, Urine SMALL (A) Negative    Microscopic Examination YES     Urine Type NotGiven     Urine Reflex to Culture Yes    Pregnancy, Urine   Result Value Ref Range    HCG(Urine) Pregnancy Test Negative Detects HCG level >20 MIU/mL   CBC with Auto Differential   Result Value Ref Range WBC 3.8 (L) 4.0 - 11.0 K/uL    RBC 4.59 4.00 - 5.20 M/uL    Hemoglobin 12.9 12.0 - 16.0 g/dL    Hematocrit 38.8 36.0 - 48.0 %    MCV 84.4 80.0 - 100.0 fL    MCH 28.1 26.0 - 34.0 pg    MCHC 33.3 31.0 - 36.0 g/dL    RDW 15.1 12.4 - 15.4 %    Platelets 713 211 - 715 K/uL    MPV 8.7 5.0 - 10.5 fL    Neutrophils % 61.2 %    Lymphocytes % 22.8 %    Monocytes % 15.2 %    Eosinophils % 0.5 %    Basophils % 0.3 %    Neutrophils Absolute 2.3 1.7 - 7.7 K/uL    Lymphocytes Absolute 0.9 (L) 1.0 - 5.1 K/uL    Monocytes Absolute 0.6 0.0 - 1.3 K/uL    Eosinophils Absolute 0.0 0.0 - 0.6 K/uL    Basophils Absolute 0.0 0.0 - 0.2 K/uL   Basic Metabolic Panel w/ Reflex to MG   Result Value Ref Range    Sodium 135 (L) 136 - 145 mmol/L    Potassium reflex Magnesium 3.6 3.5 - 5.1 mmol/L    Chloride 101 99 - 110 mmol/L    CO2 20 (L) 21 - 32 mmol/L    Anion Gap 14 3 - 16    Glucose 67 (L) 70 - 99 mg/dL    BUN 9 7 - 20 mg/dL    CREATININE 0.6 0.6 - 1.1 mg/dL    GFR Non-African American >60 >60    GFR African American >60 >60    Calcium 9.4 8.3 - 10.6 mg/dL   Hepatic Function Panel   Result Value Ref Range    Total Protein 7.7 6.4 - 8.2 g/dL    Albumin 4.1 3.4 - 5.0 g/dL    Alkaline Phosphatase 77 40 - 129 U/L    ALT 11 10 - 40 U/L    AST 13 (L) 15 - 37 U/L    Total Bilirubin 0.3 0.0 - 1.0 mg/dL    Bilirubin, Direct <0.2 0.0 - 0.3 mg/dL    Bilirubin, Indirect see below 0.0 - 1.0 mg/dL   Microscopic Urinalysis   Result Value Ref Range    Bacteria, UA 2+ (A) None Seen /HPF    Hyaline Casts, UA 1 0 - 8 /LPF    WBC, UA 16 (H) 0 - 5 /HPF    RBC,  (H) 0 - 4 /HPF    Epithelial Cells, UA 12 (H) 0 - 5 /HPF       Work-up reveals:  Urine culture pending  COVID-19 and influenza negative  Hepatic function shows normal LFTs  Metabolic panel shows mild hyponatremia and glucose slightly reduced at 67; urine pregnancy negative with microscopic urinalysis reveals bacteria 2+, WBC 16, , epithelial cells 12.   Given that high RBCs I again broached the subject regarding potential need to obtain CT a/P to rule out kidney stone. Patient declines. She was given strict return precautions. Patient will be started on antibiotics for pyelonephritis and given Zofran for nausea. She will follow-up with PCP for reevaluation in the next 2-3 days. Strict return precautions. Patient verbalized understands agreeable to plan for discharge and follow-up. FINAL IMPRESSION    1.  Acute pyelonephritis        PLAN  Discharge with close outpatient follow-up (see EMR)       (Please note that this note was completed with a voice recognition program.  Every attempt was made to edit the dictations, but inevitably there remain words that are mis-transcribed.)        CHRIST Alvarez - CNP  05/20/22 0024

## 2022-05-19 LAB
ORGANISM: ABNORMAL
URINE CULTURE, ROUTINE: ABNORMAL

## 2022-05-20 ASSESSMENT — ENCOUNTER SYMPTOMS
BACK PAIN: 0
VOMITING: 0
SORE THROAT: 0
ABDOMINAL PAIN: 0
EYE PAIN: 0
ANAL BLEEDING: 0
COUGH: 0
NAUSEA: 1
SHORTNESS OF BREATH: 0
DIARRHEA: 1

## 2022-10-10 ENCOUNTER — HOSPITAL ENCOUNTER (EMERGENCY)
Age: 22
Discharge: HOME | End: 2022-10-10
Payer: MEDICARE

## 2022-10-10 ENCOUNTER — APPOINTMENT (OUTPATIENT)
Dept: CT IMAGING | Age: 22
End: 2022-10-10
Payer: MEDICARE

## 2022-10-10 ENCOUNTER — APPOINTMENT (OUTPATIENT)
Dept: ULTRASOUND IMAGING | Age: 22
End: 2022-10-10
Payer: MEDICARE

## 2022-10-10 VITALS
TEMPERATURE: 98.6 F | BODY MASS INDEX: 23.18 KG/M2 | HEART RATE: 69 BPM | WEIGHT: 156.97 LBS | SYSTOLIC BLOOD PRESSURE: 120 MMHG | OXYGEN SATURATION: 100 % | DIASTOLIC BLOOD PRESSURE: 70 MMHG | RESPIRATION RATE: 16 BRPM

## 2022-10-10 DIAGNOSIS — N30.00 ACUTE CYSTITIS WITHOUT HEMATURIA: ICD-10-CM

## 2022-10-10 DIAGNOSIS — B96.89 BACTERIAL VAGINOSIS: ICD-10-CM

## 2022-10-10 DIAGNOSIS — N83.209 RUPTURED OVARIAN CYST: Primary | ICD-10-CM

## 2022-10-10 DIAGNOSIS — N76.0 BACTERIAL VAGINOSIS: ICD-10-CM

## 2022-10-10 LAB
A/G RATIO: 1 (ref 1.1–2.2)
ALBUMIN SERPL-MCNC: 3.6 G/DL (ref 3.4–5)
ALP BLD-CCNC: 57 U/L (ref 40–129)
ALT SERPL-CCNC: 13 U/L (ref 10–40)
ANION GAP SERPL CALCULATED.3IONS-SCNC: 9 MMOL/L (ref 3–16)
AST SERPL-CCNC: 15 U/L (ref 15–37)
BACTERIA WET PREP: ABNORMAL
BACTERIA: ABNORMAL /HPF
BASOPHILS ABSOLUTE: 0 K/UL (ref 0–0.2)
BASOPHILS RELATIVE PERCENT: 0.5 %
BILIRUB SERPL-MCNC: 0.4 MG/DL (ref 0–1)
BILIRUBIN URINE: NEGATIVE
BLOOD, URINE: ABNORMAL
BUN BLDV-MCNC: 8 MG/DL (ref 7–20)
CALCIUM SERPL-MCNC: 9 MG/DL (ref 8.3–10.6)
CHLORIDE BLD-SCNC: 105 MMOL/L (ref 99–110)
CLARITY: CLEAR
CLUE CELLS: ABNORMAL
CO2: 24 MMOL/L (ref 21–32)
COLOR: YELLOW
CREAT SERPL-MCNC: 0.6 MG/DL (ref 0.6–1.1)
EOSINOPHILS ABSOLUTE: 0.1 K/UL (ref 0–0.6)
EOSINOPHILS RELATIVE PERCENT: 1.2 %
EPITHELIAL CELLS WET PREP: ABNORMAL
EPITHELIAL CELLS, UA: 6 /HPF (ref 0–5)
GFR AFRICAN AMERICAN: >60
GFR NON-AFRICAN AMERICAN: >60
GLUCOSE BLD-MCNC: 68 MG/DL (ref 70–99)
GLUCOSE URINE: NEGATIVE MG/DL
HCG(URINE) PREGNANCY TEST: NEGATIVE
HCT VFR BLD CALC: 35.3 % (ref 36–48)
HEMOGLOBIN: 11.6 G/DL (ref 12–16)
HYALINE CASTS: 1 /LPF (ref 0–8)
KETONES, URINE: ABNORMAL MG/DL
LEUKOCYTE ESTERASE, URINE: ABNORMAL
LYMPHOCYTES ABSOLUTE: 1.8 K/UL (ref 1–5.1)
LYMPHOCYTES RELATIVE PERCENT: 37.5 %
MCH RBC QN AUTO: 27.4 PG (ref 26–34)
MCHC RBC AUTO-ENTMCNC: 32.8 G/DL (ref 31–36)
MCV RBC AUTO: 83.5 FL (ref 80–100)
MICROSCOPIC EXAMINATION: YES
MONOCYTES ABSOLUTE: 0.5 K/UL (ref 0–1.3)
MONOCYTES RELATIVE PERCENT: 9.9 %
MUCUS: PRESENT
NEUTROPHILS ABSOLUTE: 2.4 K/UL (ref 1.7–7.7)
NEUTROPHILS RELATIVE PERCENT: 50.9 %
NITRITE, URINE: POSITIVE
PDW BLD-RTO: 15.7 % (ref 12.4–15.4)
PH UA: 6 (ref 5–8)
PLATELET # BLD: 231 K/UL (ref 135–450)
PMV BLD AUTO: 8.9 FL (ref 5–10.5)
POTASSIUM SERPL-SCNC: 3.6 MMOL/L (ref 3.5–5.1)
PROTEIN UA: NEGATIVE MG/DL
RBC # BLD: 4.23 M/UL (ref 4–5.2)
RBC UA: 4 /HPF (ref 0–4)
RBC WET PREP: ABNORMAL
SODIUM BLD-SCNC: 138 MMOL/L (ref 136–145)
SOURCE WET PREP: ABNORMAL
SPECIFIC GRAVITY UA: 1.02 (ref 1–1.03)
TOTAL PROTEIN: 7.2 G/DL (ref 6.4–8.2)
TRICHOMONAS PREP: ABNORMAL
URINE REFLEX TO CULTURE: YES
URINE TYPE: ABNORMAL
UROBILINOGEN, URINE: 0.2 E.U./DL
WBC # BLD: 4.8 K/UL (ref 4–11)
WBC UA: 15 /HPF (ref 0–5)
WBC WET PREP: ABNORMAL
YEAST WET PREP: ABNORMAL

## 2022-10-10 PROCEDURE — 87086 URINE CULTURE/COLONY COUNT: CPT

## 2022-10-10 PROCEDURE — 87186 SC STD MICRODIL/AGAR DIL: CPT

## 2022-10-10 PROCEDURE — 87491 CHLMYD TRACH DNA AMP PROBE: CPT

## 2022-10-10 PROCEDURE — 76830 TRANSVAGINAL US NON-OB: CPT

## 2022-10-10 PROCEDURE — 87210 SMEAR WET MOUNT SALINE/INK: CPT

## 2022-10-10 PROCEDURE — 80053 COMPREHEN METABOLIC PANEL: CPT

## 2022-10-10 PROCEDURE — 81001 URINALYSIS AUTO W/SCOPE: CPT

## 2022-10-10 PROCEDURE — 87088 URINE BACTERIA CULTURE: CPT

## 2022-10-10 PROCEDURE — 6360000004 HC RX CONTRAST MEDICATION: Performed by: PHYSICIAN ASSISTANT

## 2022-10-10 PROCEDURE — 6370000000 HC RX 637 (ALT 250 FOR IP): Performed by: PHYSICIAN ASSISTANT

## 2022-10-10 PROCEDURE — 74177 CT ABD & PELVIS W/CONTRAST: CPT

## 2022-10-10 PROCEDURE — 84703 CHORIONIC GONADOTROPIN ASSAY: CPT

## 2022-10-10 PROCEDURE — 87591 N.GONORRHOEAE DNA AMP PROB: CPT

## 2022-10-10 PROCEDURE — 85025 COMPLETE CBC W/AUTO DIFF WBC: CPT

## 2022-10-10 PROCEDURE — 99285 EMERGENCY DEPT VISIT HI MDM: CPT

## 2022-10-10 RX ORDER — CEPHALEXIN 500 MG/1
500 CAPSULE ORAL 2 TIMES DAILY
Qty: 14 CAPSULE | Refills: 0 | Status: SHIPPED | OUTPATIENT
Start: 2022-10-10 | End: 2022-10-17

## 2022-10-10 RX ORDER — IBUPROFEN 800 MG/1
800 TABLET ORAL EVERY 8 HOURS PRN
Qty: 20 TABLET | Refills: 0 | Status: SHIPPED | OUTPATIENT
Start: 2022-10-10

## 2022-10-10 RX ORDER — ACETAMINOPHEN 500 MG
1000 TABLET ORAL ONCE
Status: COMPLETED | OUTPATIENT
Start: 2022-10-10 | End: 2022-10-10

## 2022-10-10 RX ORDER — METRONIDAZOLE 7.5 MG/G
GEL VAGINAL
Qty: 1 EACH | Refills: 0 | Status: SHIPPED | OUTPATIENT
Start: 2022-10-10

## 2022-10-10 RX ADMIN — ACETAMINOPHEN 1000 MG: 500 TABLET ORAL at 16:41

## 2022-10-10 RX ADMIN — IOPAMIDOL 75 ML: 755 INJECTION, SOLUTION INTRAVENOUS at 18:26

## 2022-10-10 ASSESSMENT — PAIN SCALES - GENERAL
PAINLEVEL_OUTOF10: 0
PAINLEVEL_OUTOF10: 0

## 2022-10-10 ASSESSMENT — PAIN - FUNCTIONAL ASSESSMENT: PAIN_FUNCTIONAL_ASSESSMENT: NONE - DENIES PAIN

## 2022-10-10 NOTE — ED PROVIDER NOTES
[address] [facility]        Pt Name: Madelaine Sanchez  MRN: [id number]  [place] 2000  Date of evaluation: 10/10/2022  Provider: MIN Koch      ADVANCED PRACTICE PROVIDER, I HAVE EVALUATED THIS PATIENT    CHIEF COMPLAINT     Abdominal pain and pelvic pain      HISTORY OF PRESENT ILLNESS  (Location/Symptom, Timing/Onset, Context/Setting, Quality, Duration,Modifying Factors, Severity.)   Madelaine Sanchez is a 23 y.o. female who presents to the emergency department for 1 day hx of abdominal pain and pelvic pain. Patient reports suprapubic abdominal pain and pelvic pain that radiates to right lower quadrant. It has been intermittent since onset. Worse today which prompted ED visit. She has not take anything yet for pain. She has history of R ovarian cyst that was an incidental finding. She denies vaginal discharge or bleeding. Denies dysuria, hematuria, and frequency. Denies nausea or vomiting. No fever. No history of abdominal surgeries. LMP was 9/14/2022. No forms of contraception. Does not think she is pregnant. Hx of previous UTI. Nursing Notes were reviewed and I agree. REVIEW OF SYSTEMS    (2-9 systems for level 4, 10 or more for level 5)   Review of Systems     Pertinent positives and negatives as per HPI. All other systems reviewed and are negative except as noted    PAST MEDICAL HISTORY         Diagnosis Date    Anxiety     Depression     Headache     Migraines        SURGICAL HISTORY   History reviewed. No pertinent surgical history.     CURRENT MEDICATIONS       Discharge Medication List as of 10/10/2022  8:25 PM        CONTINUE these medications which have NOT CHANGED    Details   ondansetron (ZOFRAN ODT) 4 MG disintegrating tablet Take 1-2 tablets by mouth every 12 hours as needed for Nausea May Sub regular tablet (non-ODT) if insurance does not cover ODT., Disp-12 tablet, R-0Print      Prenatal MV-Min-Fe Cervix appears normal,  os is closed. No CMT or left adnexal TTP. Mild right adnexal TTP. Exam performed by myself and Cynthia LOPEZ with patient consent  Musculoskeletal:         General: Normal range of motion. Cervical back: Normal range of motion and neck supple. Skin:     General: Skin is warm. Neurological:      Mental Status: She is alert. Psychiatric:         Mood and Affect: Mood normal.         Behavior: Behavior normal.         Thought Content: Thought content normal.         Judgment: Judgment normal.     DIFFERENTIAL DIAGNOSIS    ovarian cyst, ovarian torsion, ectopic pregnancy, PID, appendicitis, UTI/STD, other    DIAGNOSTICRESULTS         RADIOLOGY:   Non-plain film images such as CT, Ultrasound and MRI are read by the radiologist. Plain radiographic images are visualized and preliminarily interpreted by MIN Sims with the below findings:      Interpretation per the Radiologist below, if available at the time of this note:    CT ABDOMEN PELVIS W IV CONTRAST Additional Contrast? None   Final Result   1. Normal appendix. 2. Asymmetric fluid in the right adnexa that may represent a recently   ruptured ovarian cyst in a patient with menstrual changes otherwise noted in   the pelvis. US NON OB TRANSVAGINAL   Final Result   Unremarkable pelvic ultrasound. Normal Doppler flow within the ovaries.                LABS:  Labs Reviewed   WET PREP, GENITAL - Abnormal; Notable for the following components:       Result Value    Clue Cells, Wet Prep 1+ (*)     All other components within normal limits   URINALYSIS WITH REFLEX TO CULTURE - Abnormal; Notable for the following components:    Ketones, Urine TRACE (*)     Blood, Urine SMALL (*)     Nitrite, Urine POSITIVE (*)     Leukocyte Esterase, Urine SMALL (*)     All other components within normal limits   CBC WITH AUTO DIFFERENTIAL - Abnormal; Notable for the following components:    Hemoglobin 11.6 (*)     Hematocrit 35.3 (*)     RDW 15.7 (*)     All other components within normal limits   COMPREHENSIVE METABOLIC PANEL - Abnormal; Notable for the following components:    Glucose 68 (*)     Albumin/Globulin Ratio 1.0 (*)     All other components within normal limits   MICROSCOPIC URINALYSIS - Abnormal; Notable for the following components:    Bacteria, UA 1+ (*)     WBC, UA 15 (*)     Epithelial Cells, UA 6 (*)     Mucus, UA Present (*)     All other components within normal limits   C.TRACHOMATIS N.GONORRHOEAE DNA   CULTURE, URINE   PREGNANCY, URINE       All other labs were within normal range or not returned as of this dictation. EMERGENCY DEPARTMENT COURSE and DIFFERENTIALDIAGNOSIS/MDM:   Vitals:    Vitals:    10/10/22 1501 10/10/22 1945 10/10/22 2027   BP: 110/76 117/70 120/70   Pulse: 70 70 69   Resp: 20 16 16   Temp: 98.6 °F (37 °C)     TempSrc: Oral     SpO2: 100% 100% 100%   Weight: 156 lb 15.5 oz (71.2 kg)         Patient was nontoxic, well appearing, afebrile with normal vital signs. TVUS unremarkable with normal doppler flow in the ovaries. CT A/P showed normal appendix but asymmetric fluid in right adnexa that may represent a recently ruptured ovarian cyst. This fits with her exam. UA positive nitrites, trace ketones, small leukocyte esterase. Will treat with keflex for UTI. Wet prep 1+ clue cells. Will treat with metrogel. HCG was negative. Upon reeval, she appears very well. She is appropriate for outpatient management. She has an OB/GYN. Instructed to FU for reeval and return for worsening. She agreed and understood. Is this patient to be included in the SEP-1 Core Measure due to severe sepsis or septic shock? No   Exclusion criteria - the patient is NOT to be included for SEP-1 Core Measure due to:  2+ SIRS criteria are not met        PROCEDURES:  None    FINAL IMPRESSION      1. Ruptured ovarian cyst    2. Acute cystitis without hematuria    3.  Bacterial vaginosis DISPOSITION/PLAN   DISPOSITION Decision To Discharge 10/10/2022 08:12:37 PM      PATIENT REFERRED TO:  Your Ob/GYN    Schedule an appointment as soon as possible for a visit   for reevaluation    [facility] Emergency Department  [address] [facility]  [phone]    As needed, If symptoms worsen    DISCHARGE MEDICATIONS:  Discharge Medication List as of 10/10/2022  8:25 PM        START taking these medications    Details   metroNIDAZOLE (METROGEL VAGINAL) 0.75 % vaginal gel Apply 1 applicatorful vaginally nightly for 5 nights, Disp-1 each, R-0, Normal             (Please note that portions ofthis note were completed with a voice recognition program.  Efforts were made to edit the dictations but occasionally words are mis-transcribed.)    Consuelo Schwab, [address] [place] [address]  10/11/22 [facility]

## 2022-10-11 LAB
C TRACH DNA GENITAL QL NAA+PROBE: NEGATIVE
N. GONORRHOEAE DNA: NEGATIVE

## 2022-10-11 NOTE — ED NOTES
Discharge and education instructions reviewed. Patient verbalized understanding, teach-back successful. Patient denied questions at this time. No acute distress noted. Patient instructed to follow-up as noted - return to emergency department if symptoms worsen. Patient verbalized understanding. Discharged per EDMD with discharged instructions.        Lorie Saucedo RN  10/10/22 2027

## 2022-10-12 LAB
ORGANISM: ABNORMAL
URINE CULTURE, ROUTINE: ABNORMAL

## 2022-10-12 NOTE — RESULT ENCOUNTER NOTE
Patient's positive result has been appropriately evaluated by the provider pool. Patient was contacted and notified of the change in treatment plan. Medication was phoned to the patient's pharmacy per protocol:    Pharmacy:   [facility] [phone] - F [phone]  [address] [id number]  Phone: [phone] Fax: [phone]    Spoke with her on the phone  She has been admitted with this before  To take med as prescribed  called to above pharmacy above  to follow up with Dr Rogelio Monteiro

## 2022-10-12 NOTE — RESULT ENCOUNTER NOTE
Culture reviewed, please contact patient and inform them of the results and call in a prescription for Levaquin 500 mg p.o. daily for 7 days. Since this is a resistant organism it is important that she follow-up after she finishes the Levaquin to have her urine rechecked. If the infection has not resolved it may require IV antibiotics.

## 2023-05-24 ENCOUNTER — APPOINTMENT (OUTPATIENT)
Dept: CT IMAGING | Age: 23
End: 2023-05-24
Payer: MEDICAID

## 2023-05-24 ENCOUNTER — HOSPITAL ENCOUNTER (EMERGENCY)
Age: 23
Discharge: HOME | End: 2023-05-24
Payer: MEDICAID

## 2023-05-24 VITALS
WEIGHT: 143.74 LBS | HEIGHT: 70 IN | SYSTOLIC BLOOD PRESSURE: 99 MMHG | BODY MASS INDEX: 20.58 KG/M2 | OXYGEN SATURATION: 98 % | DIASTOLIC BLOOD PRESSURE: 58 MMHG | TEMPERATURE: 98.8 F | RESPIRATION RATE: 16 BRPM | HEART RATE: 72 BPM

## 2023-05-24 DIAGNOSIS — R31.9 URINARY TRACT INFECTION WITH HEMATURIA, SITE UNSPECIFIED: Primary | ICD-10-CM

## 2023-05-24 DIAGNOSIS — N39.0 URINARY TRACT INFECTION WITH HEMATURIA, SITE UNSPECIFIED: Primary | ICD-10-CM

## 2023-05-24 LAB
ALBUMIN SERPL-MCNC: 4.1 G/DL (ref 3.4–5)
ALBUMIN/GLOB SERPL: 1.2 {RATIO} (ref 1.1–2.2)
ALP SERPL-CCNC: 49 U/L (ref 40–129)
ALT SERPL-CCNC: 10 U/L (ref 10–40)
ANION GAP SERPL CALCULATED.3IONS-SCNC: 7 MMOL/L (ref 3–16)
AST SERPL-CCNC: 14 U/L (ref 15–37)
BACTERIA URNS QL MICRO: ABNORMAL /HPF
BASOPHILS # BLD: 0 K/UL (ref 0–0.2)
BASOPHILS NFR BLD: 0.3 %
BILIRUB SERPL-MCNC: 0.7 MG/DL (ref 0–1)
BILIRUB UR QL STRIP.AUTO: NEGATIVE
BUN SERPL-MCNC: 6 MG/DL (ref 7–20)
CALCIUM SERPL-MCNC: 9.2 MG/DL (ref 8.3–10.6)
CHLORIDE SERPL-SCNC: 104 MMOL/L (ref 99–110)
CLARITY UR: ABNORMAL
CO2 SERPL-SCNC: 26 MMOL/L (ref 21–32)
COLOR UR: YELLOW
CREAT SERPL-MCNC: 0.7 MG/DL (ref 0.6–1.1)
DEPRECATED RDW RBC AUTO: 17.6 % (ref 12.4–15.4)
EOSINOPHIL # BLD: 0.1 K/UL (ref 0–0.6)
EOSINOPHIL NFR BLD: 1.6 %
EPI CELLS #/AREA URNS AUTO: 4 /HPF (ref 0–5)
GFR SERPLBLD CREATININE-BSD FMLA CKD-EPI: >60 ML/MIN/{1.73_M2}
GLUCOSE SERPL-MCNC: 89 MG/DL (ref 70–99)
GLUCOSE UR STRIP.AUTO-MCNC: NEGATIVE MG/DL
HCG UR QL: NEGATIVE
HCT VFR BLD AUTO: 38.2 % (ref 36–48)
HGB BLD-MCNC: 12.7 G/DL (ref 12–16)
HGB UR QL STRIP.AUTO: ABNORMAL
HYALINE CASTS #/AREA URNS AUTO: 0 /LPF (ref 0–8)
KETONES UR STRIP.AUTO-MCNC: ABNORMAL MG/DL
LEUKOCYTE ESTERASE UR QL STRIP.AUTO: ABNORMAL
LIPASE SERPL-CCNC: 13 U/L (ref 13–60)
LYMPHOCYTES # BLD: 0.5 K/UL (ref 1–5.1)
LYMPHOCYTES NFR BLD: 10.9 %
MCH RBC QN AUTO: 28.5 PG (ref 26–34)
MCHC RBC AUTO-ENTMCNC: 33.4 G/DL (ref 31–36)
MCV RBC AUTO: 85.3 FL (ref 80–100)
MONOCYTES # BLD: 0.4 K/UL (ref 0–1.3)
MONOCYTES NFR BLD: 8.1 %
NEUTROPHILS # BLD: 3.5 K/UL (ref 1.7–7.7)
NEUTROPHILS NFR BLD: 79.1 %
NITRITE UR QL STRIP.AUTO: NEGATIVE
PH UR STRIP.AUTO: 8 [PH] (ref 5–8)
PLATELET # BLD AUTO: 241 K/UL (ref 135–450)
PMV BLD AUTO: 10.1 FL (ref 5–10.5)
POTASSIUM SERPL-SCNC: 4 MMOL/L (ref 3.5–5.1)
PROT SERPL-MCNC: 7.6 G/DL (ref 6.4–8.2)
PROT UR STRIP.AUTO-MCNC: ABNORMAL MG/DL
RBC # BLD AUTO: 4.47 M/UL (ref 4–5.2)
RBC CLUMPS #/AREA URNS AUTO: 2 /HPF (ref 0–4)
SODIUM SERPL-SCNC: 137 MMOL/L (ref 136–145)
SP GR UR STRIP.AUTO: 1.02 (ref 1–1.03)
UA COMPLETE W REFLEX CULTURE PNL UR: YES
UA DIPSTICK W REFLEX MICRO PNL UR: YES
URN SPEC COLLECT METH UR: ABNORMAL
UROBILINOGEN UR STRIP-ACNC: 1 E.U./DL
WBC # BLD AUTO: 4.5 K/UL (ref 4–11)
WBC #/AREA URNS AUTO: 108 /HPF (ref 0–5)

## 2023-05-24 PROCEDURE — 6370000000 HC RX 637 (ALT 250 FOR IP): Performed by: PHYSICIAN ASSISTANT

## 2023-05-24 PROCEDURE — 6360000004 HC RX CONTRAST MEDICATION: Performed by: PHYSICIAN ASSISTANT

## 2023-05-24 PROCEDURE — 99285 EMERGENCY DEPT VISIT HI MDM: CPT

## 2023-05-24 PROCEDURE — 6360000002 HC RX W HCPCS: Performed by: PHYSICIAN ASSISTANT

## 2023-05-24 PROCEDURE — 87086 URINE CULTURE/COLONY COUNT: CPT

## 2023-05-24 PROCEDURE — 80053 COMPREHEN METABOLIC PANEL: CPT

## 2023-05-24 PROCEDURE — 84703 CHORIONIC GONADOTROPIN ASSAY: CPT

## 2023-05-24 PROCEDURE — 85025 COMPLETE CBC W/AUTO DIFF WBC: CPT

## 2023-05-24 PROCEDURE — 81001 URINALYSIS AUTO W/SCOPE: CPT

## 2023-05-24 PROCEDURE — 74177 CT ABD & PELVIS W/CONTRAST: CPT

## 2023-05-24 PROCEDURE — 96374 THER/PROPH/DIAG INJ IV PUSH: CPT

## 2023-05-24 PROCEDURE — 83690 ASSAY OF LIPASE: CPT

## 2023-05-24 RX ORDER — CEFUROXIME AXETIL 250 MG/1
250 TABLET ORAL 2 TIMES DAILY
Qty: 10 TABLET | Refills: 0 | Status: SHIPPED | OUTPATIENT
Start: 2023-05-24 | End: 2023-05-29

## 2023-05-24 RX ORDER — OXYCODONE HYDROCHLORIDE AND ACETAMINOPHEN 5; 325 MG/1; MG/1
1 TABLET ORAL ONCE
Status: COMPLETED | OUTPATIENT
Start: 2023-05-24 | End: 2023-05-24

## 2023-05-24 RX ORDER — PHENAZOPYRIDINE HYDROCHLORIDE 100 MG/1
100 TABLET, FILM COATED ORAL 2 TIMES DAILY
Qty: 6 TABLET | Refills: 0 | Status: SHIPPED | OUTPATIENT
Start: 2023-05-24 | End: 2023-05-27

## 2023-05-24 RX ORDER — ONDANSETRON 2 MG/ML
4 INJECTION INTRAMUSCULAR; INTRAVENOUS ONCE
Status: COMPLETED | OUTPATIENT
Start: 2023-05-24 | End: 2023-05-24

## 2023-05-24 RX ADMIN — IOPAMIDOL 75 ML: 755 INJECTION, SOLUTION INTRAVENOUS at 10:38

## 2023-05-24 RX ADMIN — OXYCODONE AND ACETAMINOPHEN 1 TABLET: 5; 325 TABLET ORAL at 09:54

## 2023-05-24 RX ADMIN — ONDANSETRON 4 MG: 2 INJECTION INTRAMUSCULAR; INTRAVENOUS at 09:54

## 2023-05-24 ASSESSMENT — PAIN SCALES - GENERAL
PAINLEVEL_OUTOF10: 7

## 2023-05-24 ASSESSMENT — PAIN DESCRIPTION - DESCRIPTORS: DESCRIPTORS: SHARP;CRAMPING

## 2023-05-24 ASSESSMENT — PAIN DESCRIPTION - PAIN TYPE: TYPE: ACUTE PAIN

## 2023-05-24 ASSESSMENT — PAIN - FUNCTIONAL ASSESSMENT: PAIN_FUNCTIONAL_ASSESSMENT: 0-10

## 2023-05-24 ASSESSMENT — PAIN DESCRIPTION - LOCATION
LOCATION: ABDOMEN
LOCATION: ABDOMEN

## 2023-05-24 ASSESSMENT — PAIN DESCRIPTION - FREQUENCY: FREQUENCY: INTERMITTENT

## 2023-05-24 ASSESSMENT — PAIN DESCRIPTION - ORIENTATION: ORIENTATION: LEFT

## 2023-05-24 NOTE — ED PROVIDER NOTES
**ADVANCED PRACTICE PROVIDER, I HAVE EVALUATED THIS PATIENT**        [address]      Pt Name: Isela Rodriguez  CSX:[id number]  [place] 2000  Date of evaluation: 5/24/2023  Provider: Serene Harmon PA-C  Note Started: 12:22 PM EDT 5/26/2023        Chief Complaint:    Chief Complaint   Patient presents with    Abdominal Pain     LLQ pain that started this morning. Pt with N&V, pain radiates from ovary into back. States feels like contraction type pain. Hx of ovarian cysts. Denies pain with urination. Nursing Notes, Past Medical Hx, Past Surgical Hx, Social Hx, Allergies, and Family Hx were all reviewed and agreed with or any disagreements were addressed in the HPI.    HPI: (Location, Duration, Timing, Severity, Quality, Assoc Sx, Context, Modifying factors)    History From: Patient  Limitations to history : None    Social Determinants Significantly Affecting Health : None    Chief Complaint of left lower quadrant Nas pain. And lower abdominal pain cramping. States that it feels like edema to her ovarian cyst that ruptured. She denies vaginal bleeding. No vaginal discharge. Some nausea no vomiting. No diarrhea constipation. Pain does not radiate to her back. No other complaints. No fevers. No lightheaded or dizziness. No weakness. This is a  20 y.o. female who presents to emergency room with the above complaint    PastMedical/Surgical History:      Diagnosis Date    Anxiety     Depression     Headache     Migraines      History reviewed. No pertinent surgical history.     Medications:  Discharge Medication List as of 5/24/2023 12:27 PM        CONTINUE these medications which have NOT CHANGED    Details   metroNIDAZOLE (METROGEL VAGINAL) 0.75 % vaginal gel Apply 1 applicatorful vaginally nightly for 5 nights, Disp-1 each, R-0, Normal      ibuprofen (ADVIL;MOTRIN) 800 MG tablet Take 1 tablet by mouth every 8 hours as

## 2023-05-24 NOTE — DISCHARGE INSTRUCTIONS
Take prescribed medication as prescribed only  Follow-up with your primary care provider in 1 week to have urine rechecked to make sure this clears up  Drink plenty of water

## 2023-05-24 NOTE — ED TRIAGE NOTES
LLQ pain that started this morning. Pt with N&V, pain radiates from ovary into back. States feels like contraction type pain. Hx of ovarian cysts.

## 2023-05-25 LAB — BACTERIA UR CULT: NORMAL

## 2023-05-26 ASSESSMENT — ENCOUNTER SYMPTOMS
NAUSEA: 0
SHORTNESS OF BREATH: 0
BACK PAIN: 0
VOMITING: 0
SORE THROAT: 0
ABDOMINAL PAIN: 1
EYE PAIN: 0
COUGH: 0

## 2024-09-18 ENCOUNTER — HOSPITAL ENCOUNTER (EMERGENCY)
Age: 24
Discharge: HOME | End: 2024-09-18
Payer: MEDICAID

## 2024-09-18 ENCOUNTER — APPOINTMENT (OUTPATIENT)
Dept: CT IMAGING | Age: 24
End: 2024-09-18
Payer: MEDICAID

## 2024-09-18 VITALS
RESPIRATION RATE: 18 BRPM | SYSTOLIC BLOOD PRESSURE: 98 MMHG | WEIGHT: 125.22 LBS | OXYGEN SATURATION: 99 % | BODY MASS INDEX: 17.93 KG/M2 | HEART RATE: 65 BPM | DIASTOLIC BLOOD PRESSURE: 67 MMHG | TEMPERATURE: 98 F | HEIGHT: 70 IN

## 2024-09-18 DIAGNOSIS — I89.8: ICD-10-CM

## 2024-09-18 DIAGNOSIS — R59.0 ENLARGED LYMPH NODE IN NECK: Primary | ICD-10-CM

## 2024-09-18 LAB
ANION GAP SERPL CALCULATED.3IONS-SCNC: 7 MMOL/L (ref 3–16)
BASOPHILS # BLD: 0 K/UL (ref 0–0.2)
BASOPHILS NFR BLD: 1 %
BUN SERPL-MCNC: 9 MG/DL (ref 7–20)
CALCIUM SERPL-MCNC: 9.1 MG/DL (ref 8.3–10.6)
CHLORIDE SERPL-SCNC: 109 MMOL/L (ref 99–110)
CO2 SERPL-SCNC: 24 MMOL/L (ref 21–32)
CREAT SERPL-MCNC: 0.7 MG/DL (ref 0.6–1.1)
DEPRECATED RDW RBC AUTO: 13.3 % (ref 12.4–15.4)
EOSINOPHIL # BLD: 0 K/UL (ref 0–0.6)
EOSINOPHIL NFR BLD: 1.7 %
GFR SERPLBLD CREATININE-BSD FMLA CKD-EPI: >90 ML/MIN/{1.73_M2}
GLUCOSE SERPL-MCNC: 82 MG/DL (ref 70–99)
HCG SERPL QL: NEGATIVE
HCT VFR BLD AUTO: 38.9 % (ref 36–48)
HGB BLD-MCNC: 13.3 G/DL (ref 12–16)
LYMPHOCYTES # BLD: 1 K/UL (ref 1–5.1)
LYMPHOCYTES NFR BLD: 33.8 %
MCH RBC QN AUTO: 31.1 PG (ref 26–34)
MCHC RBC AUTO-ENTMCNC: 34.1 G/DL (ref 31–36)
MCV RBC AUTO: 91.2 FL (ref 80–100)
MONOCYTES # BLD: 0.4 K/UL (ref 0–1.3)
MONOCYTES NFR BLD: 14.2 %
NEUTROPHILS # BLD: 1.5 K/UL (ref 1.7–7.7)
NEUTROPHILS NFR BLD: 49.3 %
PLATELET # BLD AUTO: 199 K/UL (ref 135–450)
PMV BLD AUTO: 9.3 FL (ref 5–10.5)
POTASSIUM SERPL-SCNC: 4.1 MMOL/L (ref 3.5–5.1)
RBC # BLD AUTO: 4.26 M/UL (ref 4–5.2)
SODIUM SERPL-SCNC: 140 MMOL/L (ref 136–145)
WBC # BLD AUTO: 2.9 K/UL (ref 4–11)

## 2024-09-18 PROCEDURE — 85025 COMPLETE CBC W/AUTO DIFF WBC: CPT

## 2024-09-18 PROCEDURE — 99285 EMERGENCY DEPT VISIT HI MDM: CPT

## 2024-09-18 PROCEDURE — 96374 THER/PROPH/DIAG INJ IV PUSH: CPT

## 2024-09-18 PROCEDURE — 6360000002 HC RX W HCPCS: Performed by: PHYSICIAN ASSISTANT

## 2024-09-18 PROCEDURE — 80048 BASIC METABOLIC PNL TOTAL CA: CPT

## 2024-09-18 PROCEDURE — 84703 CHORIONIC GONADOTROPIN ASSAY: CPT

## 2024-09-18 PROCEDURE — 70491 CT SOFT TISSUE NECK W/DYE: CPT

## 2024-09-18 PROCEDURE — 6360000004 HC RX CONTRAST MEDICATION: Performed by: PHYSICIAN ASSISTANT

## 2024-09-18 RX ORDER — KETOROLAC TROMETHAMINE 15 MG/ML
15 INJECTION, SOLUTION INTRAMUSCULAR; INTRAVENOUS ONCE
Status: COMPLETED | OUTPATIENT
Start: 2024-09-18 | End: 2024-09-18

## 2024-09-18 RX ORDER — FLUCONAZOLE 150 MG/1
150 TABLET ORAL ONCE
Qty: 1 TABLET | Refills: 0 | Status: SHIPPED | OUTPATIENT
Start: 2024-09-18 | End: 2024-09-18

## 2024-09-18 RX ORDER — IOPAMIDOL 755 MG/ML
75 INJECTION, SOLUTION INTRAVASCULAR
Status: COMPLETED | OUTPATIENT
Start: 2024-09-18 | End: 2024-09-18

## 2024-09-18 RX ADMIN — IOPAMIDOL 75 ML: 755 INJECTION, SOLUTION INTRAVENOUS at 10:15

## 2024-09-18 RX ADMIN — KETOROLAC TROMETHAMINE 15 MG: 15 INJECTION, SOLUTION INTRAMUSCULAR; INTRAVENOUS at 09:33

## 2024-09-18 ASSESSMENT — PAIN - FUNCTIONAL ASSESSMENT
PAIN_FUNCTIONAL_ASSESSMENT: ACTIVITIES ARE NOT PREVENTED
PAIN_FUNCTIONAL_ASSESSMENT: 0-10

## 2024-09-18 ASSESSMENT — PAIN DESCRIPTION - PAIN TYPE
TYPE: ACUTE PAIN
TYPE: ACUTE PAIN

## 2024-09-18 ASSESSMENT — PAIN DESCRIPTION - LOCATION
LOCATION: NECK
LOCATION: NECK

## 2024-09-18 ASSESSMENT — PAIN SCALES - GENERAL
PAINLEVEL_OUTOF10: 5
PAINLEVEL_OUTOF10: 3

## 2024-09-18 ASSESSMENT — PAIN DESCRIPTION - DESCRIPTORS: DESCRIPTORS: ACHING

## 2024-09-18 ASSESSMENT — LIFESTYLE VARIABLES
HOW OFTEN DO YOU HAVE A DRINK CONTAINING ALCOHOL: NEVER
HOW MANY STANDARD DRINKS CONTAINING ALCOHOL DO YOU HAVE ON A TYPICAL DAY: PATIENT DOES NOT DRINK

## 2024-09-18 ASSESSMENT — PAIN DESCRIPTION - FREQUENCY: FREQUENCY: CONTINUOUS

## 2024-09-18 ASSESSMENT — PAIN DESCRIPTION - ORIENTATION
ORIENTATION: RIGHT
ORIENTATION: RIGHT

## 2024-09-18 NOTE — DISCHARGE INSTRUCTIONS
Continue to monitor your symptoms take the antibiotics as prescribed.  If you develop worsening symptoms such as fever worsening pain worsening swelling difficulty swallowing or moving the neck you need to return immediately to the emergency department.  Otherwise, follow-up with ENT, call today to schedule an appointment to be seen next week.

## 2024-09-18 NOTE — ED TRIAGE NOTES
Pt presents to the ED with c/c of mass on right side of neck. Pt states hurts to touch and doesn't have full range of motion. Started on Avi night.

## 2024-09-18 NOTE — ED PROVIDER NOTES
[facility] EMERGENCY DEPARTMENT  EMERGENCY DEPARTMENT ENCOUNTER        Pt Name: Claudia Miller  MRN: [id number]  Birthdate 2000  Date of evaluation: 9/18/2024  Provider: MIN Bettencourt  PCP: Karla Roberts MD  Note Started: 9:18 AM EDT 9/18/24      CAROLANN. I have evaluated this patient.        CHIEF COMPLAINT       Chief Complaint   Patient presents with   • Mass     Pt presents to the ED with c/c of mass on right side of neck. Pt states hurts to touch and doesn't have full range of motion. Started on Sunday night.       HISTORY OF PRESENT ILLNESS: 1 or more Elements     History from : Patient    Limitations to history : None    Claudia Miller is a 24 y.o. female who presents from home for evaluation of pain to the right side of her neck.  Patient notes that she woke up Sunday and had some discomfort with some pain to the right side of her neck.  She notes it is painful to touch and painful when she moves her neck.  She denies any recent injury to the neck.  Denies any recent fever, no runny nose nasal congestion sore throat no difficulty or pain with swallowing.  No chest pain or shortness of breath.  She has not taken any medicine for it.  She presents today because it is getting worse.  She is never had anything like this before.  She has no other acute concerns or complaints at this time.    Nursing Notes were all reviewed and agreed with or any disagreements were addressed in the HPI.    REVIEW OF SYSTEMS :      Review of Systems    Positives and Pertinent negatives as per HPI.     SURGICAL HISTORY   No past surgical history on file.    CURRENTMEDICATIONS       Previous Medications    BUPROPION (WELLBUTRIN XL) 150 MG EXTENDED RELEASE TABLET    TAKE 1 TABLET BY MOUTH EVERY DAY    IBUPROFEN (ADVIL;MOTRIN) 800 MG TABLET    Take 1 tablet by mouth every 8 hours as needed for Pain    METRONIDAZOLE (METROGEL VAGINAL) 0.75 % VAGINAL GEL    Apply 1 applicatorful vaginally nightly for 5      DISPOSITION/PLAN     DISPOSITION Decision To Discharge 09/18/2024 12:33:38 PM  Condition at Disposition: Stable      PATIENT REFERRED TO:  Ghassan Vivar MD  [address] [address]  [phone]    Schedule an appointment as soon as possible for a visit         DISCHARGE MEDICATIONS:  Discharge Medication List as of 9/18/2024 12:41 PM        START taking these medications    Details   amoxicillin-clavulanate (AUGMENTIN) 875-125 MG per tablet Take 1 tablet by mouth 2 times daily for 10 days, Disp-20 tablet, R-0Normal      fluconazole (DIFLUCAN) 150 MG tablet Take 1 tablet by mouth once for 1 dose, Disp-1 tablet, R-0Normal             DISCONTINUED MEDICATIONS:  Discharge Medication List as of 9/18/2024 12:41 PM        STOP taking these medications       butalbital-APAP-caffeine (FIORICET) -40 MG CAPS per capsule Comments:   Reason for Stopping:                      (Please note that portions of this note were completed with a voice recognition program.  Efforts were made to edit the dictations but occasionally words are mis-transcribed.)    MIN Bettencourt (electronically signed)            Annika Wooten PA  09/27/24 5933

## 2024-10-10 ENCOUNTER — OFFICE VISIT (OUTPATIENT)
Dept: ENT CLINIC | Age: 24
End: 2024-10-10
Payer: MEDICAID

## 2024-10-10 VITALS
HEART RATE: 67 BPM | SYSTOLIC BLOOD PRESSURE: 99 MMHG | OXYGEN SATURATION: 98 % | DIASTOLIC BLOOD PRESSURE: 65 MMHG | WEIGHT: 128 LBS | HEIGHT: 70 IN | BODY MASS INDEX: 18.32 KG/M2

## 2024-10-10 DIAGNOSIS — I88.9 CERVICAL LYMPHADENITIS: Primary | ICD-10-CM

## 2024-10-10 DIAGNOSIS — M54.2 NECK PAIN: ICD-10-CM

## 2024-10-10 PROCEDURE — 99203 OFFICE O/P NEW LOW 30 MIN: CPT | Performed by: OTOLARYNGOLOGY

## 2024-10-10 NOTE — PROGRESS NOTES
[facility]  DIVISION OF OTOLARYNGOLOGY- HEAD & NECK SURGERY  CONSULT      Patient Name: Claudia Miller  Medical Record Number:  [id number]  Primary Care Physician:  Karla Roberts MD  Date of Consultation: 10/10/2024    Chief Complaint: Neck pain        HISTORY OF PRESENT ILLNESS  Claudia is a(n) 24 y.o. female who presents for evaluation of neck pain.  The patient says that a couple weeks ago she went to bed with some mild neck pain and woke up and it was very sore.  She was having difficulty moving her neck.  She went to the emergency room and was treated with antibiotics.  She said the neck pain got better.  She said she is feeling normal.  She denies any other symptoms.  She does not remember having any illness prior to the neck pain.  She denies any scalp lesions.  She is otherwise healthy.            REVIEW OF SYSTEMS  As above    PHYSICAL EXAM  GENERAL: No Acute Distress, Alert and Oriented, no Hoarseness, strong voice  EYES: EOMI, Anti-icteric  HENT:   Head: Normocephalic and atraumatic.   Face:  Symmetric, facial nerve intact, no sinus tenderness  Right Ear: Normal external ear, normal external auditory canal, intact tympanic membrane with normal mobility and aerated middle ear  Left Ear: Normal external ear, normal external auditory canal, intact tympanic membrane with normal mobility and aerated middle ear  Mouth/Oral Cavity:  normal lips, Uvula is midline, no mucosal lesions, no trismus  Oropharynx/Larynx:  normal oropharynx,  Nose:Normal external nasal appearance.    NECK: Normal range of motion, no thyromegaly, trachea is midline, no lymphadenopathy, no neck masses, no crepitus        RADIOLOGY  Summary of findings:  I reviewed the CT scan of the neck she had done in the emergency room.  There is about a 1 cm right posterior triangle node in the region she described as hurting.  It is not technically enlarged, but does appear mildly inflamed            ASSESSMENT/PLAN  1. Cervical lymphadenitis  She